# Patient Record
Sex: MALE | Race: WHITE | Employment: OTHER | URBAN - METROPOLITAN AREA
[De-identification: names, ages, dates, MRNs, and addresses within clinical notes are randomized per-mention and may not be internally consistent; named-entity substitution may affect disease eponyms.]

---

## 2017-10-16 ENCOUNTER — APPOINTMENT (OUTPATIENT)
Dept: GENERAL RADIOLOGY | Age: 82
End: 2017-10-16
Attending: EMERGENCY MEDICINE
Payer: MEDICARE

## 2017-10-16 ENCOUNTER — HOSPITAL ENCOUNTER (OUTPATIENT)
Age: 82
Setting detail: OBSERVATION
Discharge: NURSING, REHAB OR LONG-TERM CARE | End: 2017-10-20
Attending: EMERGENCY MEDICINE | Admitting: INTERNAL MEDICINE
Payer: MEDICARE

## 2017-10-16 ENCOUNTER — APPOINTMENT (OUTPATIENT)
Dept: CT IMAGING | Age: 82
End: 2017-10-16
Attending: EMERGENCY MEDICINE
Payer: MEDICARE

## 2017-10-16 ENCOUNTER — APPOINTMENT (OUTPATIENT)
Dept: MRI IMAGING | Age: 82
End: 2017-10-16
Attending: INTERNAL MEDICINE
Payer: MEDICARE

## 2017-10-16 DIAGNOSIS — R29.898 LEFT LEG WEAKNESS: Primary | ICD-10-CM

## 2017-10-16 PROBLEM — E11.9 DM (DIABETES MELLITUS) (HCC): Status: ACTIVE | Noted: 2017-10-16

## 2017-10-16 PROBLEM — R53.1 WEAKNESS: Status: ACTIVE | Noted: 2017-10-16

## 2017-10-16 PROBLEM — N18.30 CKD (CHRONIC KIDNEY DISEASE) STAGE 3, GFR 30-59 ML/MIN (HCC): Status: ACTIVE | Noted: 2017-10-16

## 2017-10-16 PROBLEM — J44.9 COPD (CHRONIC OBSTRUCTIVE PULMONARY DISEASE) (HCC): Status: ACTIVE | Noted: 2017-10-16

## 2017-10-16 PROBLEM — M54.9 BACK PAIN: Status: ACTIVE | Noted: 2017-10-16

## 2017-10-16 LAB
ALBUMIN SERPL-MCNC: 3.3 G/DL (ref 3.5–5)
ALBUMIN/GLOB SERPL: 0.7 {RATIO} (ref 1.1–2.2)
ALP SERPL-CCNC: 80 U/L (ref 45–117)
ALT SERPL-CCNC: 17 U/L (ref 12–78)
ANION GAP SERPL CALC-SCNC: 7 MMOL/L (ref 5–15)
AST SERPL-CCNC: 14 U/L (ref 15–37)
ATRIAL RATE: 93 BPM
BASOPHILS # BLD: 0 K/UL (ref 0–0.1)
BASOPHILS NFR BLD: 1 % (ref 0–1)
BILIRUB SERPL-MCNC: 0.4 MG/DL (ref 0.2–1)
BUN SERPL-MCNC: 39 MG/DL (ref 6–20)
BUN/CREAT SERPL: 18 (ref 12–20)
CALCIUM SERPL-MCNC: 9.5 MG/DL (ref 8.5–10.1)
CALCULATED P AXIS, ECG09: 27 DEGREES
CALCULATED R AXIS, ECG10: -3 DEGREES
CALCULATED T AXIS, ECG11: 6 DEGREES
CHLORIDE SERPL-SCNC: 107 MMOL/L (ref 97–108)
CK MB CFR SERPL CALC: NORMAL % (ref 0–2.5)
CK MB SERPL-MCNC: <1 NG/ML (ref 5–25)
CK SERPL-CCNC: 53 U/L (ref 39–308)
CO2 SERPL-SCNC: 29 MMOL/L (ref 21–32)
CREAT SERPL-MCNC: 2.22 MG/DL (ref 0.7–1.3)
DIAGNOSIS, 93000: NORMAL
EOSINOPHIL # BLD: 0.5 K/UL (ref 0–0.4)
EOSINOPHIL NFR BLD: 7 % (ref 0–7)
ERYTHROCYTE [DISTWIDTH] IN BLOOD BY AUTOMATED COUNT: 13.6 % (ref 11.5–14.5)
GLOBULIN SER CALC-MCNC: 4.5 G/DL (ref 2–4)
GLUCOSE BLD STRIP.AUTO-MCNC: 142 MG/DL (ref 65–100)
GLUCOSE SERPL-MCNC: 160 MG/DL (ref 65–100)
HCT VFR BLD AUTO: 40.4 % (ref 36.6–50.3)
HGB BLD-MCNC: 12.9 G/DL (ref 12.1–17)
INR PPP: 1.2 (ref 0.9–1.1)
LYMPHOCYTES # BLD: 0.9 K/UL (ref 0.8–3.5)
LYMPHOCYTES NFR BLD: 12 % (ref 12–49)
MCH RBC QN AUTO: 29.3 PG (ref 26–34)
MCHC RBC AUTO-ENTMCNC: 31.9 G/DL (ref 30–36.5)
MCV RBC AUTO: 91.6 FL (ref 80–99)
MONOCYTES # BLD: 0.5 K/UL (ref 0–1)
MONOCYTES NFR BLD: 7 % (ref 5–13)
NEUTS SEG # BLD: 5.3 K/UL (ref 1.8–8)
NEUTS SEG NFR BLD: 73 % (ref 32–75)
P-R INTERVAL, ECG05: 238 MS
PLATELET # BLD AUTO: 273 K/UL (ref 150–400)
POTASSIUM SERPL-SCNC: 4.5 MMOL/L (ref 3.5–5.1)
PROT SERPL-MCNC: 7.8 G/DL (ref 6.4–8.2)
PROTHROMBIN TIME: 12 SEC (ref 9–11.1)
Q-T INTERVAL, ECG07: 398 MS
QRS DURATION, ECG06: 142 MS
QTC CALCULATION (BEZET), ECG08: 494 MS
RBC # BLD AUTO: 4.41 M/UL (ref 4.1–5.7)
SERVICE CMNT-IMP: ABNORMAL
SODIUM SERPL-SCNC: 143 MMOL/L (ref 136–145)
VENTRICULAR RATE, ECG03: 93 BPM
WBC # BLD AUTO: 7.3 K/UL (ref 4.1–11.1)

## 2017-10-16 PROCEDURE — G8979 MOBILITY GOAL STATUS: HCPCS

## 2017-10-16 PROCEDURE — 74011000250 HC RX REV CODE- 250: Performed by: INTERNAL MEDICINE

## 2017-10-16 PROCEDURE — 85025 COMPLETE CBC W/AUTO DIFF WBC: CPT | Performed by: EMERGENCY MEDICINE

## 2017-10-16 PROCEDURE — 82550 ASSAY OF CK (CPK): CPT | Performed by: EMERGENCY MEDICINE

## 2017-10-16 PROCEDURE — 99218 HC RM OBSERVATION: CPT

## 2017-10-16 PROCEDURE — 71020 XR CHEST PA LAT: CPT

## 2017-10-16 PROCEDURE — 82962 GLUCOSE BLOOD TEST: CPT

## 2017-10-16 PROCEDURE — 70450 CT HEAD/BRAIN W/O DYE: CPT

## 2017-10-16 PROCEDURE — G8978 MOBILITY CURRENT STATUS: HCPCS

## 2017-10-16 PROCEDURE — 80053 COMPREHEN METABOLIC PANEL: CPT | Performed by: EMERGENCY MEDICINE

## 2017-10-16 PROCEDURE — 77030013140 HC MSK NEB VYRM -A

## 2017-10-16 PROCEDURE — 74011250636 HC RX REV CODE- 250/636: Performed by: INTERNAL MEDICINE

## 2017-10-16 PROCEDURE — 74011250637 HC RX REV CODE- 250/637: Performed by: INTERNAL MEDICINE

## 2017-10-16 PROCEDURE — 36415 COLL VENOUS BLD VENIPUNCTURE: CPT | Performed by: EMERGENCY MEDICINE

## 2017-10-16 PROCEDURE — 96372 THER/PROPH/DIAG INJ SC/IM: CPT

## 2017-10-16 PROCEDURE — 99285 EMERGENCY DEPT VISIT HI MDM: CPT

## 2017-10-16 PROCEDURE — 97161 PT EVAL LOW COMPLEX 20 MIN: CPT

## 2017-10-16 PROCEDURE — 65390000012 HC CONDITION CODE 44 OBSERVATION

## 2017-10-16 PROCEDURE — 85610 PROTHROMBIN TIME: CPT | Performed by: EMERGENCY MEDICINE

## 2017-10-16 PROCEDURE — 93005 ELECTROCARDIOGRAM TRACING: CPT

## 2017-10-16 PROCEDURE — 97530 THERAPEUTIC ACTIVITIES: CPT

## 2017-10-16 PROCEDURE — 94640 AIRWAY INHALATION TREATMENT: CPT

## 2017-10-16 RX ORDER — SODIUM CHLORIDE 0.9 % (FLUSH) 0.9 %
5-10 SYRINGE (ML) INJECTION EVERY 8 HOURS
Status: DISCONTINUED | OUTPATIENT
Start: 2017-10-16 | End: 2017-10-20 | Stop reason: HOSPADM

## 2017-10-16 RX ORDER — ATORVASTATIN CALCIUM 10 MG/1
10 TABLET, FILM COATED ORAL DAILY
Status: DISCONTINUED | OUTPATIENT
Start: 2017-10-17 | End: 2017-10-20 | Stop reason: HOSPADM

## 2017-10-16 RX ORDER — GUAIFENESIN 100 MG/5ML
81 LIQUID (ML) ORAL DAILY
Status: DISCONTINUED | OUTPATIENT
Start: 2017-10-17 | End: 2017-10-20 | Stop reason: HOSPADM

## 2017-10-16 RX ORDER — ARFORMOTEROL TARTRATE 15 UG/2ML
15 SOLUTION RESPIRATORY (INHALATION) 2 TIMES DAILY
Status: DISCONTINUED | OUTPATIENT
Start: 2017-10-16 | End: 2017-10-16

## 2017-10-16 RX ORDER — ATORVASTATIN CALCIUM 10 MG/1
10 TABLET, FILM COATED ORAL DAILY
COMMUNITY

## 2017-10-16 RX ORDER — ARFORMOTEROL TARTRATE 15 UG/2ML
15 SOLUTION RESPIRATORY (INHALATION) 2 TIMES DAILY
COMMUNITY

## 2017-10-16 RX ORDER — VILAZODONE HYDROCHLORIDE 20 MG/1
20 TABLET ORAL EVERY EVENING
Status: DISCONTINUED | OUTPATIENT
Start: 2017-10-16 | End: 2017-10-20 | Stop reason: HOSPADM

## 2017-10-16 RX ORDER — HEPARIN SODIUM 5000 [USP'U]/ML
5000 INJECTION, SOLUTION INTRAVENOUS; SUBCUTANEOUS EVERY 8 HOURS
Status: DISCONTINUED | OUTPATIENT
Start: 2017-10-16 | End: 2017-10-20 | Stop reason: HOSPADM

## 2017-10-16 RX ORDER — BUDESONIDE 0.5 MG/2ML
500 INHALANT ORAL
Status: DISCONTINUED | OUTPATIENT
Start: 2017-10-16 | End: 2017-10-20 | Stop reason: HOSPADM

## 2017-10-16 RX ORDER — SODIUM CHLORIDE 450 MG/100ML
50 INJECTION, SOLUTION INTRAVENOUS CONTINUOUS
Status: DISCONTINUED | OUTPATIENT
Start: 2017-10-16 | End: 2017-10-16

## 2017-10-16 RX ORDER — DEXTROSE 50 % IN WATER (D50W) INTRAVENOUS SYRINGE
12.5-25 AS NEEDED
Status: DISCONTINUED | OUTPATIENT
Start: 2017-10-16 | End: 2017-10-20 | Stop reason: HOSPADM

## 2017-10-16 RX ORDER — FENOFIBRATE 150 MG/1
CAPSULE ORAL
COMMUNITY
End: 2017-10-16

## 2017-10-16 RX ORDER — GLIMEPIRIDE 2 MG/1
2 TABLET ORAL EVERY EVENING
COMMUNITY

## 2017-10-16 RX ORDER — FENOFIBRATE 145 MG/1
145 TABLET, COATED ORAL EVERY EVENING
Status: DISCONTINUED | OUTPATIENT
Start: 2017-10-16 | End: 2017-10-20 | Stop reason: HOSPADM

## 2017-10-16 RX ORDER — BUDESONIDE 0.5 MG/2ML
500 INHALANT ORAL 2 TIMES DAILY
COMMUNITY

## 2017-10-16 RX ORDER — GUAIFENESIN 100 MG/5ML
81 LIQUID (ML) ORAL DAILY
COMMUNITY

## 2017-10-16 RX ORDER — SODIUM CHLORIDE 0.9 % (FLUSH) 0.9 %
5-10 SYRINGE (ML) INJECTION AS NEEDED
Status: DISCONTINUED | OUTPATIENT
Start: 2017-10-16 | End: 2017-10-20 | Stop reason: HOSPADM

## 2017-10-16 RX ORDER — INSULIN LISPRO 100 [IU]/ML
INJECTION, SOLUTION INTRAVENOUS; SUBCUTANEOUS EVERY 6 HOURS
Status: DISCONTINUED | OUTPATIENT
Start: 2017-10-16 | End: 2017-10-16

## 2017-10-16 RX ORDER — ARFORMOTEROL TARTRATE 15 UG/2ML
15 SOLUTION RESPIRATORY (INHALATION)
Status: DISCONTINUED | OUTPATIENT
Start: 2017-10-16 | End: 2017-10-20 | Stop reason: HOSPADM

## 2017-10-16 RX ORDER — MAGNESIUM SULFATE 100 %
4 CRYSTALS MISCELLANEOUS AS NEEDED
Status: DISCONTINUED | OUTPATIENT
Start: 2017-10-16 | End: 2017-10-20 | Stop reason: HOSPADM

## 2017-10-16 RX ORDER — MONTELUKAST SODIUM 10 MG/1
10 TABLET ORAL EVERY EVENING
COMMUNITY

## 2017-10-16 RX ORDER — FENOFIBRATE 145 MG/1
145 TABLET, COATED ORAL EVERY EVENING
COMMUNITY

## 2017-10-16 RX ORDER — MAGNESIUM SULFATE 100 %
4 CRYSTALS MISCELLANEOUS AS NEEDED
Status: DISCONTINUED | OUTPATIENT
Start: 2017-10-16 | End: 2017-10-16 | Stop reason: SDUPTHER

## 2017-10-16 RX ORDER — GLIMEPIRIDE 2 MG/1
2 TABLET ORAL
Status: DISCONTINUED | OUTPATIENT
Start: 2017-10-17 | End: 2017-10-18

## 2017-10-16 RX ORDER — INSULIN LISPRO 100 [IU]/ML
INJECTION, SOLUTION INTRAVENOUS; SUBCUTANEOUS
Status: DISCONTINUED | OUTPATIENT
Start: 2017-10-16 | End: 2017-10-20 | Stop reason: HOSPADM

## 2017-10-16 RX ORDER — MONTELUKAST SODIUM 10 MG/1
10 TABLET ORAL EVERY EVENING
Status: DISCONTINUED | OUTPATIENT
Start: 2017-10-16 | End: 2017-10-20 | Stop reason: HOSPADM

## 2017-10-16 RX ORDER — ZOLPIDEM TARTRATE 5 MG/1
2.5 TABLET ORAL
Status: DISCONTINUED | OUTPATIENT
Start: 2017-10-16 | End: 2017-10-20 | Stop reason: HOSPADM

## 2017-10-16 RX ORDER — VILAZODONE HYDROCHLORIDE 10 MG/1
20 TABLET ORAL EVERY EVENING
COMMUNITY

## 2017-10-16 RX ORDER — ZOLPIDEM TARTRATE 5 MG/1
2.5 TABLET ORAL
COMMUNITY

## 2017-10-16 RX ADMIN — HEPARIN SODIUM 5000 UNITS: 5000 INJECTION, SOLUTION INTRAVENOUS; SUBCUTANEOUS at 19:40

## 2017-10-16 RX ADMIN — FENOFIBRATE 145 MG: 145 TABLET ORAL at 19:40

## 2017-10-16 RX ADMIN — Medication 10 ML: at 19:41

## 2017-10-16 RX ADMIN — VILAZODONE HYDROCHLORIDE 20 MG: 20 TABLET ORAL at 22:17

## 2017-10-16 RX ADMIN — ZOLPIDEM TARTRATE 2.5 MG: 5 TABLET ORAL at 23:51

## 2017-10-16 RX ADMIN — BUDESONIDE 500 MCG: 0.5 INHALANT RESPIRATORY (INHALATION) at 20:47

## 2017-10-16 RX ADMIN — Medication 10 ML: at 22:18

## 2017-10-16 RX ADMIN — MONTELUKAST SODIUM 10 MG: 10 TABLET, FILM COATED ORAL at 22:21

## 2017-10-16 RX ADMIN — ARFORMOTEROL TARTRATE 15 MCG: 15 SOLUTION RESPIRATORY (INHALATION) at 20:47

## 2017-10-16 NOTE — Clinical Note
Status[de-identified] Inpatient [101] Type of Bed: Telemetry [19] Inpatient Hospitalization Certified Necessary for the Following Reasons: 3. Patient receiving treatment that can only be provided in an inpatient setting (further clarification in H&P documentation) Admitting Diagnosis: Weakness [639170] Admitting Physician: Fernie Wisdom Attending Physician: Fernie Wisdom Estimated Length of Stay: 2 Midnights Discharge Plan[de-identified] Extended Care Facility (e.g. Adult Home, Nursing Home, etc.)

## 2017-10-16 NOTE — H&P
[facility]  [address], [address]  [phone]    Admission History and Physical      NAME:  Mora Bunch   :   8/10/1927   MRN:  [id number]     PCP:  David Smith MD     Date/Time:  10/16/2017         Subjective:     CHIEF COMPLAINT: lower back pain and LT leg weakness      HISTORY OF PRESENT ILLNESS:     Mr. Isaías Mireles is a 80 y.o.  male who is admitted with LT leg weakness. Mr. Isaías Mireles with PMH of DM, CKD, HTN, hyperlipidemia, COPD was brought to ER c/o lower back pain and LT leg weakness. Lower back pain started few months ago and recently became worse. Pt is sedentary and has constant sharp lower back pain. The pain is moderate in intensity worse with movement. Yesterday, he started to have LT leg weakness, which was sudden in onset. Has difficulty to ambulate and family had to help him to go to bed. Denies trauma to his back. Denies weakness or numbness of the arm. Denies vision problem or slurred speech. No urine or bowel incontinent. Past Medical History:   Diagnosis Date    [facility] obstructive pulmonary disease ([facility] [address] [facility]     Diabetes ([facility] [address]         Past Surgical History:   Procedure Laterality Date    HX CHOLECYSTECTOMY      HX HEENT      bilateral cataracts    HX ORTHOPAEDIC         Social History   Substance Use Topics    Smoking status: Former Smoker     Packs/day: 1.00     Years: 10.00     Quit date:     Smokeless tobacco: Never Used    Alcohol use No        Family History   Problem Relation Age of Onset    Diabetes Mother         No Known Allergies     Prior to Admission medications    Medication Sig Start Date End Date Taking? Authorizing Provider   aspirin 81 mg chewable tablet Take 81 mg by mouth daily. Yes Marie Ibarra MD   SITagliptin (JANUVIA) 100 mg tablet Take 100 mg by mouth daily. Yes Marie Ibarra MD   atorvastatin (LIPITOR) 10 mg tablet Take 10 mg by mouth daily.    Yes Marie Ibarra MD   montelukast (SINGULAIR) 10 mg tablet Take 10 mg by mouth every evening. Yes Marie Ibarra MD   glimepiride (AMARYL) 2 mg tablet Take 2 mg by mouth every evening. Yes Marie Ibarra MD   vilazodone (VIIBRYD) 10 mg tab tablet Take 20 mg by mouth every evening. Yes Marie Ibarra MD   zolpidem (AMBIEN) 5 mg tablet Take 2.5 mg by mouth nightly. Yes Marie Ibarra MD   budesonide (PULMICORT) 0.5 mg/2 mL nbsp 500 mcg by Nebulization route two (2) times a day. Use with arformoterol (Brovana) nebs   Yes Marie Ibarra MD   arformoterol (BROVANA) 15 mcg/2 mL nebu neb solution 15 mcg by Nebulization route two (2) times a day. Use with budesonide (Pulmicort) nebs   Yes Marie Ibarra MD   fenofibrate nanocrystallized (TRICOR) 145 mg tablet Take 145 mg by mouth every evening. Yes Historical Provider   Vit A,C,E-Zinc-Copper (PRESERVISION AREDS) cap capsule Take 1 Cap by mouth two (2) times a day.    Yes Historical Provider         Review of Systems:  (bold if positive, if negative)    Gen:  Eyes:  ENT:  CVS:  Pulm:  GI:    :    MS:  Skin:  Psych:  Endo:    Hem:  Renal:    Neuro:  weakness,          Objective:      VITALS:    Vital signs reviewed; most recent are:    Visit Vitals    /68    Pulse 82    Temp (P) 98.2 °F (36.8 °C)    Resp 15    SpO2 99%     SpO2 Readings from Last 6 Encounters:   10/16/17 99%    O2 Flow Rate (L/min): 2 l/min   No intake or output data in the 24 hours ending 10/16/17 0046         Exam:     Physical Exam:    Gen:  Well-developed, well-nourished, in no acute distress  HEENT:  Pink conjunctivae, PERRL, hearing intact to voice, moist mucous membranes  Neck:  Supple, without masses, thyroid non-tender  Resp:  No accessory muscle use, clear breath sounds without wheezes rales or rhonchi  Card:  No murmurs, normal S1, S2 without thrills, bruits or peripheral edema  Abd:  Soft, non-tender, non-distended, normoactive bowel sounds are present, no palpable organomegaly and no detectable hernias  Lymph:  No cervical or inguinal adenopathy  Musc:  No cyanosis or clubbing  Skin:  No rashes or ulcers, skin turgor is good  Neuro:  Cranial nerves are grossly intact, BL leg weakness. , follows commands appropriately  Psych:  Good insight, oriented to person, place and time, alert       Labs:    Recent Labs      10/16/17   1206   WBC  7.3   HGB  12.9   HCT  40.4   PLT  273     Recent Labs      10/16/17   1206   NA  143   K  4.5   CL  107   CO2  29   GLU  160*   BUN  39*   CREA  2.22*   CA  9.5   ALB  3.3*   TBILI  0.4   SGOT  14*   ALT  17     No results found for: GLUCPOC  No results for input(s): PH, PCO2, PO2, HCO3, FIO2 in the last 72 hours. Recent Labs      10/16/17   1206   INR  1.2*       Telemetry reviewed:           Assessment/Plan:    1. LT leg weakness (10/16/2017)/ lower back pain. Will admit under observation. Check MRI of the L spine to R/O L spine issues causing his current problem. CT of the head is unremarkable. Pain control. Consult orthopedics, PT/OT. Fall precaution. 2.  DM (diabetes mellitus) ([facility] [address] (10/16/2017). Continue home meds and cover with SSI    3.  CKD (chronic kidney disease) stage 3, GFR 30-59 ml/min (10/16/2017). unknown baseline creatinine. Continue to monitor. 4.  COPD (chronic obstructive pulmonary disease) ([facility] [address] (10/16/2017). Stable. Not wheezing. Continue home nebs. 5.  Hyperlipidemia. Continue statin. Code status: DNR.      Previous medical records reviewed     Risk of deterioration: high      Total time spent with patient: 79 [address] discussed with: Patient, Family, Nursing Staff and >50% of time spent in counseling and coordination of care    Discussed:  Care Plan    Prophylaxis:  Hep SQ    Probable Disposition:  Home w/Family           ___________________________________________________    Attending Physician: Leti Augustin MD

## 2017-10-16 NOTE — PROGRESS NOTES
BSI: MED RECONCILIATION    Comments/Recommendations:    Patient reports compliance with medications below, although he acknowledges that he did not have his nebulizer treatments as usual today. Medications added:     · Preservision PO BID    Medications removed:    · none    Medications adjusted:    · Changed fenofibrate, glimipiride, montelukast, and Viibryd all to QPM per patient's medication schedule    Information obtained from: patient, patient's daughter, medication list/schedule from home    Significant PMH/Disease States:   Past Medical History:   Diagnosis Date    Chronic obstructive pulmonary disease ([facility] [address]     Diabetes ([facility] [address]      Chief Complaint for this Admission:   Chief Complaint   Patient presents with    Back Pain     Allergies: Review of patient's allergies indicates no known allergies. Prior to Admission Medications:     Medication Documentation Review Audit       Reviewed by ARUN ChavezD (Pharmacist) on 10/16/17 at 53-69-10-18         Medication Sig Documenting Provider Last Dose Status Taking?      arformoterol (BROVANA) 15 mcg/2 mL nebu neb solution 15 mcg by Nebulization route two (2) times a day. Use with budesonide (Pulmicort) nebs Marie Ibarra MD 10/15/2017 pm Active Yes    aspirin 81 mg chewable tablet Take 81 mg by mouth daily. Marie Ibarra MD 10/16/2017 am Active Yes    atorvastatin (LIPITOR) 10 mg tablet Take 10 mg by mouth daily. Marie Ibarra MD 10/16/2017 am Active Yes    budesonide (PULMICORT) 0.5 mg/2 mL nbsp 500 mcg by Nebulization route two (2) times a day. Use with arformoterol (Brovana) karstens Marie Ibarra MD 10/15/2017 pm Active Yes    fenofibrate nanocrystallized (TRICOR) 145 mg tablet Take 145 mg by mouth every evening. Historical Provider 10/15/2017 pm Active Yes    glimepiride (AMARYL) 2 mg tablet Take 2 mg by mouth every evening. Marie Ibarra MD 10/15/2017 pm Active Yes    montelukast (SINGULAIR) 10 mg tablet Take 10 mg by mouth every evening.  Marie Ibarra MD 10/15/2017 pm Active Yes    SITagliptin (JANUVIA) 100 mg tablet Take 100 mg by mouth daily. Marie Ibarra MD 10/16/2017 am Active Yes    vilazodone (VIIBRYD) 10 mg tab tablet Take 20 mg by mouth every evening. Marie Ibarra MD 10/15/2017 pm Active Yes    Vit A,C,E-Zinc-Copper (PRESERVISION AREDS) cap capsule Take 1 Cap by mouth two (2) times a day. Historical Provider 10/16/2017 am Active Yes    zolpidem (AMBIEN) 5 mg tablet Take 2.5 mg by mouth nightly. Marie Ibarra MD 10/15/2017 hs Active Yes                  Thank you for the consult,  Jamal CHAVEZ, [facility]

## 2017-10-16 NOTE — PROGRESS NOTES
[facility] Dosing Services  Pharmacist Renal Dosing Progress Note for sitagliptin   Physician: Kathie Heaton    Sitagliptin was automatically dose-adjusted per [facility] P&T Committee Protocol with respect to renal function. Order changed to sitagliptin 25 mg PO daily with breakfast for CrCl < 30 mL/min. Pt Weight:   Wt Readings from Last 1 Encounters:   10/16/17 99.8 kg (220 lb)     Previous Regimen Sitagliptin 100 mg PO daily   Serum Creatinine Lab Results   Component Value Date/Time    Creatinine 2.22 10/16/2017 12:06 PM       Creatinine Clearance Estimated Creatinine Clearance: 25.3 mL/min (based on Cr of 2.22). BUN Lab Results   Component Value Date/Time    BUN 39 10/16/2017 12:06 PM         Additional notes:   Unknown baseline renal function   Monitor BG    Pharmacy will continue to monitor patient's renal fucntion daily and will make dosage adjustments based upon changing renal function.     Thank you,  Adriano Stephenson, PharmD, [facility]

## 2017-10-16 NOTE — IP AVS SNAPSHOT
[address] [facility] 
[phone] Patient: Mora Bunch MRN: [id number] :8/10/1927 You are allergic to the following No active allergies Immunizations Administered for This Admission Name Date Influenza Vaccine (Quad) PF 10/20/2017 Recent Documentation Smoking Status Former Smoker Emergency Contacts Name Discharge Info Relation Home Work [employer] DISCHARGE CAREGIVER [3] Daughter [21]   [phone] About your hospitalization You were admitted on:  2017 You last received care in the:  [facility] 5M1 MED SURG 1 You were discharged on:  2017 Unit phone number:  [phone] Why you were hospitalized Your primary diagnosis was:  Not on File Your diagnoses also included:  Weakness, Ckd (Chronic Kidney Disease) Stage 3, Gfr 30-59 Ml/Min, Dm (Diabetes Mellitus) (Hcc), Copd (Chronic Obstructive Pulmonary Disease) (Hcc), Back Pain Providers Seen During Your Hospitalizations Provider Role Specialty Primary office phone Lucy Arreola MD Attending Provider Emergency Medicine [phone] Vikki Butt MD Attending Provider Internal Medicine [phone] Your Primary Care Physician (PCP) Primary Care Physician Office Phone Office Fax [facility] [phone] Follow-up Information Follow up With Details Comments Contact Info David Smith MD Schedule an appointment as soon as possible for a visit in 1 week  [address] [facility] [facility] 
[phone] Rashel Jimenez MD Schedule an appointment as soon as possible for a visit As needed [address] 
[phone] [address] [place] [address] [phone] Current Discharge Medication List  
  
START taking these medications Dose & Instructions Dispensing Information Comments Morning Noon Evening Bedtime  
 methylPREDNISolone 4 mg Tab Commonly known as:  MEDROL Your last dose was: Your next dose is: Take 1 tab 3 times a day for 1 day, then 2 times a day for 1 days, then 1 per day. Quantity:  6 Tab Refills:  0  
     
   
   
   
  
 senna-docusate 8.6-50 mg per tablet Commonly known as:  [employer] Your last dose was: Your next dose is:    
   
   
 Dose:  1 Tab Take 1 Tab by mouth daily for 30 days. Quantity:  30 Tab Refills:  0 CONTINUE these medications which have CHANGED Dose & Instructions Dispensing Information Comments Morning Noon Evening Bedtime SITagliptin 50 mg tablet Commonly known as:  Ina Sine What changed:   
- medication strength 
- how much to take Your last dose was: Your next dose is:    
   
   
 Dose:  50 mg Take 1 Tab by mouth daily. Quantity:  30 Tab Refills:  0 CONTINUE these medications which have NOT CHANGED Dose & Instructions Dispensing Information Comments Morning Noon Evening Bedtime  
 aspirin 81 mg chewable tablet Your last dose was: Your next dose is:    
   
   
 Dose:  81 mg Take 81 mg by mouth daily. Refills:  0  
     
   
   
   
  
 atorvastatin 10 mg tablet Commonly known as:  LIPITOR Your last dose was: Your next dose is:    
   
   
 Dose:  10 mg Take 10 mg by mouth daily. Refills:  0 BROVANA 15 mcg/2 mL Nebu neb solution Generic drug:  arformoterol Your last dose was: Your next dose is:    
   
   
 Dose:  15 mcg 15 mcg by Nebulization route two (2) times a day. Use with budesonide (Pulmicort) nebs Refills:  0 budesonide 0.5 mg/2 mL Nbsp Commonly known as:  PULMICORT Your last dose was: Your next dose is:    
   
   
 Dose:  500 mcg 500 mcg by Nebulization route two (2) times a day. Use with arformoterol (Brovana) nebs Refills:  0  
     
   
   
   
  
 fenofibrate nanocrystallized 145 mg tablet Commonly known as:  [employer] Your last dose was: Your next dose is:    
   
   
 Dose:  145 mg Take 145 mg by mouth every evening. Refills:  0  
     
   
   
   
  
 glimepiride 2 mg tablet Commonly known as:  AMARYL Your last dose was: Your next dose is:    
   
   
 Dose:  2 mg Take 2 mg by mouth every evening. Refills:  0  
     
   
   
   
  
 montelukast 10 mg tablet Commonly known as:  SINGULAIR Your last dose was: Your next dose is:    
   
   
 Dose:  10 mg Take 10 mg by mouth every evening. Refills:  0 PRESERVISION AREDS Cap capsule Generic drug:  Vit A,C,E-Zinc-Copper Your last dose was: Your next dose is:    
   
   
 Dose:  1 Cap Take 1 Cap by mouth two (2) times a day. Refills:  0 VIIBRYD 10 mg Tab tablet Generic drug:  vilazodone Your last dose was: Your next dose is:    
   
   
 Dose:  20 mg Take 20 mg by mouth every evening. Refills:  0  
     
   
   
   
  
 zolpidem 5 mg tablet Commonly known as:  AMBIEN Your last dose was: Your next dose is:    
   
   
 Dose:  2.5 mg Take 2.5 mg by mouth nightly. Refills:  0 Where to Get Your Medications Information on where to get these meds will be given to you by the nurse or doctor. ! Ask your nurse or doctor about these medications  
  methylPREDNISolone 4 mg Tab  
 senna-docusate 8.6-50 mg per tablet SITagliptin 50 mg tablet Discharge Instructions Patient Discharge Instructions Jesse Zepeda / [id number] : 8/10/1927 Admitted 10/16/2017 Discharged: 10/19/2017 Primary Diagnoses Problem List as of 10/19/2017  Date Reviewed: 10/16/2017 Codes Class Noted - Resolved Weakness CKD (chronic kidney disease) stage 3, GFR 30-59 ml/min DM (diabetes mellitus) ([facility] [address] COPD (chronic obstructive pulmonary disease) ([facility] [address] Back pain Take Home Medications · It is important that you take the medication exactly as they are prescribed. · Keep your medication in the bottles provided by the pharmacist and keep a list of the medication names, dosages, and times to be taken in your wallet. · Do not take other medications without consulting your doctor. What to do at [facility] Recommended diet: Cardiac Diet, Diabetic Diet and Low fat, Low cholesterol Recommended activity: Activity as tolerated If you experience worse pain, please follow up with Orthopedic surgery. Follow-up with your PCP in a few weeks Information obtained by : 
I understand that if any problems occur once I am at home I am to contact my physician. I understand and acknowledge receipt of the instructions indicated above. Physician's or R.N.'s Signature                                                                  Date/Time Patient or Representative Signature                                                          Date/Time Discharge Orders None [employer] Announcement We are excited to announce that we are making your provider's discharge notes available to you in [employer].   You will see these notes when they are completed and signed by the physician that discharged you from your recent hospital stay. If you have any questions or concerns about any information you see in [employer], please call the Health Information Department where you were seen or reach out to your Primary Care Provider for more information about your plan of care. Introducing [facility]! Wilton Martinez introduces [employer] patient portal. Now you can access parts of your medical record, email your doctor's office, and request medication refills online. 1. In your internet browser, go to [email or web address] 2. Click on the First Time User? Click Here link in the Sign In box. You will see the New Member Sign Up page. 3. Enter your [employer] Access Code exactly as it appears below. You will not need to use this code after youve completed the sign-up process. If you do not sign up before the expiration date, you must request a new code. · [employer] Access Code: [email or web address] Expires: 1/15/2018  1:28 PM 
 
4. Enter the last four digits of your Social Security Number (xxxx) and Date of Birth (mm/dd/yyyy) as indicated and click Submit. You will be taken to the next sign-up page. 5. Create a [employer] ID. This will be your [employer] login ID and cannot be changed, so think of one that is secure and easy to remember. 6. Create a [employer] password. You can change your password at any time. 7. Enter your Password Reset Question and Answer. This can be used at a later time if you forget your password. 8. Enter your e-mail address. You will receive e-mail notification when new information is available in [address] 9. Click Sign Up. You can now view and download portions of your medical record. 10. Click the Download Summary menu link to download a portable copy of your medical information. If you have questions, please visit the Frequently Asked Questions section of the [employer] website. Remember, [employer] is NOT to be used for urgent needs. For medical emergencies, dial 911. Now available from your iPhone and Android! General Information Please provide this summary of care documentation to your next provider. Patient Signature:  ____________________________________________________________ Date:  ____________________________________________________________  
  
Jean-Claude Bougie Provider Signature:  ____________________________________________________________ Date:  ____________________________________________________________

## 2017-10-16 NOTE — PROGRESS NOTES
Problem: Mobility Impaired (Adult and Pediatric)  Goal: *Acute Goals and Plan of Care (Insert Text)  Physical Therapy Goals  Initiated 10/16/2017  1. Patient will move from supine to sit and sit to supine in bed with independence within 7 day(s). 2. Patient will transfer from bed to chair and chair to bed with modified independence using the least restrictive device within 7 day(s). 3. Patient will perform sit to stand with modified independence within 7 day(s). 4. Patient will ambulate with modified independence for 150 feet with the least restrictive device within 7 day(s). 5. Patient will ascend/descend 2 stairs with one handrail(s) with minimal assistance/contact guard assist within 7 day(s). PHYSICAL THERAPY EVALUATION  Patient: Julio C Mix (79 y.o. male)  Date: 10/16/2017  Primary Diagnosis: Weakness  Weakness        Precautions:   Fall      ASSESSMENT :  Based on the objective data described below, the patient presents with c/o back pain, decreased strength, and impaired balance limiting patient's safe functional mobility. Patient ambulates with SPC at baseline, lives with 2 daughters. Patient required mod assist x 1 for bed mobility this date, but unable to stand due to c/o worsened back pain with mobility. Patient assisted back to supine and positioned for comfort. Patient awaiting MRI and says he is to be admitted. Will continue to follow and progress mobility as tolerated. Discharge disposition pending. Patient will benefit from skilled intervention to address the above impairments.   Patients rehabilitation potential is considered to be Fair  Factors which may influence rehabilitation potential include:   [ ]         None noted  [ ]         Mental ability/status  [X]         Medical condition  [X]         Home/family situation and support systems  [ ]         Safety awareness  [X]         Pain tolerance/management  [ ]         Other:        PLAN :  Recommendations and Planned Interventions:  [X]           Bed Mobility Training             [ ]    Neuromuscular Re-Education  [X]           Transfer Training                   [ ]    Orthotic/Prosthetic Training  [X]           Gait Training                         [ ]    Modalities  [X]           Therapeutic Exercises           [ ]    Edema Management/Control  [X]           Therapeutic Activities            [X]    Patient and Family Training/Education  [ ]           Other (comment):     Frequency/Duration: Patient will be followed by physical therapy  5 times a week to address goals. Discharge Recommendations: To Be Determined  Further Equipment Recommendations for Discharge: TBD       SUBJECTIVE:   Patient stated I don't think I can do it. My leg just gave out on me yesterday.       OBJECTIVE DATA SUMMARY:   HISTORY:    Past Medical History:   Diagnosis Date    [facility]      Diabetes ([facility] [address]       Past Surgical History:   Procedure Laterality Date    HX CHOLECYSTECTOMY        HX HEENT         bilateral cataracts    HX ORTHOPAEDIC         Prior Level of Function/Home Situation: ambulates with SPC, lives with 2 daughters  Personal factors and/or comorbidities impacting plan of care:      Home Situation  Home Environment: Private residence  # Steps to Enter: 2  Rails to Enter: Yes  One/Two Story Residence: One story  Living Alone: No  Support Systems: Child(kori)  Patient Expects to be Discharged to[de-identified] Private residence  Current DME Used/Available at Home: Cane, straight, Other (comment) (transport chair)     EXAMINATION/PRESENTATION/DECISION MAKING:   Critical Behavior:  Neurologic State: Alert  Orientation Level: Oriented X4  Cognition: Appropriate decision making, Appropriate for age attention/concentration, Appropriate safety awareness  Safety/Judgement: Awareness of environment, Insight into deficits  Hearing:   Auditory  Auditory Impairment: Hard of hearing, bilateral  Skin:  Intact   Edema: none Range Of Motion:  AROM: Within functional limits           PROM: Within functional limits           Strength:    Strength: Generally decreased, functional                    Tone & Sensation:   Tone: Normal              Sensation: Intact               Coordination:  Coordination: Within functional limits  Vision:      Functional Mobility:  Bed Mobility:     Supine to Sit: Moderate assistance;Assist x1  Sit to Supine: Moderate assistance;Assist x1     Transfers:  Sit to Stand:  (unable due to pain)                          Balance:   Sitting: Intact; With support  Standing:  (unable due to pain)  Ambulation/Gait Training:              Gait Description (WDL):  (unable)                 Functional Measure:  Barthel Index:      Bathin  Bladder: 10  Bowels: 10  Groomin  Dressin  Feeding: 10  Mobility: 0  Stairs: 0  Toilet Use: 0  Transfer (Bed to Chair and Back): 0  Total: 40         Barthel and G-code impairment scale:  Percentage of impairment CH  0% CI  1-19% CJ  20-39% CK  40-59% CL  60-79% CM  80-99% CN  100%   Barthel Score 0-100 100 99-80 79-60 59-40 20-39 1-19    0   Barthel Score 0-20 20 17-19 13-16 9-12 5-8 1-4 0      The Barthel ADL Index: Guidelines  1. The index should be used as a record of what a patient does, not as a record of what a patient could do. 2. The main aim is to establish degree of independence from any help, physical or verbal, however minor and for whatever reason. 3. The need for supervision renders the patient not independent. 4. A patient's performance should be established using the best available evidence. Asking the patient, friends/relatives and nurses are the usual sources, but direct observation and common sense are also important. However direct testing is not needed. 5. Usually the patient's performance over the preceding 24-48 hours is important, but occasionally longer periods will be relevant.   6. Middle categories imply that the patient supplies over 50 per cent of the effort. 7. Use of aids to be independent is allowed. Mitchell Johnson., Barthel, D.W. (5800). Functional evaluation: the Barthel Index. [address] (14)2. WILIAN Ramirez, Lynn Eagle.Leo., [place], [address] (1999). Measuring the change indisability after inpatient rehabilitation; comparison of the responsiveness of the Barthel Index and Functional [place] Measure. Journal of Neurology, Neurosurgery, and Psychiatry, 66(4), 806-502. EZEKIEL Landon, MADELAINE Ramsey, & Rossi Hall M.A. (2004.) Assessment of post-stroke quality of life in cost-effectiveness studies: The usefulness of the Barthel Index and the EuroQoL-5D. Quality of Life Research, 13, 888-14         G codes: In compliance with CMSs Claims Based Outcome Reporting, the following G-code set was chosen for this patient based on their primary functional limitation being treated: The outcome measure chosen to determine the severity of the functional limitation was the Barthel Index with a score of 40/100 which was correlated with the impairment scale. · Mobility - Walking and Moving Around:               - CURRENT STATUS:    CK - 40%-59% impaired, limited or restricted               - GOAL STATUS:           CJ - 20%-39% impaired, limited or restricted               - D/C STATUS:                       ---------------To be determined---------------         Pain:  Pain Scale 1: (P) Numeric (0 - 10)  Pain Intensity 1: (P) 2              Activity Tolerance:   VSS  Please refer to the flowsheet for vital signs taken during this treatment.   After treatment:   [ ]         Patient left in no apparent distress sitting up in chair  [X]         Patient left in no apparent distress in bed  [X]         Call bell left within reach  [X]         Nursing notified  [ ]         Caregiver present  [ ]         Bed alarm activated      COMMUNICATION/EDUCATION:   The patients plan of care was discussed with: Registered Nurse.  [X]         Fall prevention education was provided and the patient/caregiver indicated understanding. [X]         Patient/family have participated as able in goal setting and plan of care. [X]         Patient/family agree to work toward stated goals and plan of care. [ ]         Patient understands intent and goals of therapy, but is neutral about his/her participation. [ ]         Patient is unable to participate in goal setting and plan of care.      Thank you for this referral.  Higinio Nuñez, PT   Time Calculation: 15 mins

## 2017-10-16 NOTE — ED NOTES
Pt's daughter states pt normally wears home O2 at 2lpm. Dr. Birch Flurry notified. Pt placed on O2 at 2lpm via nasal cannula.

## 2017-10-16 NOTE — ED PROVIDER NOTES
Patient is a 80 y.o. male presenting with back pain. The history is provided by the patient. Back Pain    This is a chronic problem. The problem has been resolved. Patient reports not work related injury. The pain is associated with no known injury. The pain is present in the lower back. Associated symptoms include numbness and weakness. Pertinent negatives include no fever, no headaches and no abdominal pain. The patient's surgical history non-contributory        Past Medical History:   Diagnosis Date    [facility] obstructive pulmonary [facility] ([facility] [address] [facility]     Diabetes ([facility] [address] [facility]        Past Surgical History:   Procedure Laterality Date    HX CHOLECYSTECTOMY      HX HEENT      bilateral cataracts    HX ORTHOPAEDIC           Family History:   Problem Relation Age of Onset    Diabetes Mother        Social History     Social History    Marital status:      Spouse name: N/A    Number of children: N/A    Years of education: N/A     Occupational History    Not on file. Social History Main Topics    Smoking status: Former Smoker     Packs/day: 1.00     Years: 10.00     Quit date: 1967    Smokeless tobacco: Never Used    Alcohol use No    Drug use: No    Sexual activity: Not on file      Comment: not asked     Other Topics Concern    Not on file     Social History Narrative    No narrative on file         ALLERGIES: Review of patient's allergies indicates no known allergies. Review of Systems   Constitutional: Negative for chills and fever. Gastrointestinal: Negative for abdominal pain, nausea and vomiting. Genitourinary: Negative for flank pain. Musculoskeletal: Positive for back pain. Negative for neck pain. Neurological: Positive for weakness and numbness. Negative for seizures, light-headedness and headaches. All other systems reviewed and are negative.       Vitals:    10/16/17 1315 10/16/17 1330 10/16/17 1345 10/16/17 1412   BP: 120/66 119/61 130/68 145/76   Pulse: 90 88 88    Resp: 18 17 17    Temp:       SpO2: 98% 99% 99% 95%            Physical Exam   Constitutional: He is oriented to person, place, and time. He appears well-developed and well-nourished. No distress. HENT:   Head: Normocephalic and atraumatic. Mouth/Throat: Oropharynx is clear and moist.   Eyes: Conjunctivae and EOM are normal. Pupils are equal, round, and reactive to light. Neck: Normal range of motion. Cardiovascular: Normal rate, regular rhythm, normal heart sounds and intact distal pulses. No murmur heard. Pulmonary/Chest: Effort normal and breath sounds normal. No stridor. No respiratory distress. Wearing NC oxygen (baseline 2 liters)   Abdominal: Soft. Bowel sounds are normal. There is no tenderness. Musculoskeletal: Normal range of motion. He exhibits no edema, tenderness or deformity. No tenderness of lower back   Neurological: He is alert and oriented to person, place, and time. No cranial nerve deficit. GCS eye subscore is 4. GCS verbal subscore is 5. GCS motor subscore is 6. Decreased strength in left lower extremity vs the right and slight decrease in sensation vs the right leg as well   Skin: Skin is warm and dry. He is not diaphoretic. Psychiatric: He has a normal mood and affect. Nursing note and vitals reviewed. MDM  Number of Diagnoses or Management Options  Left leg weakness:   Diagnosis management comments: Patient with left leg weakness since yesterday - hx of lower back pain - but denies any back pain at this time, denies any fall, but near fall yesterday when his left leg felt like it would not hold him up. Patient able to move the left leg some at this time. Check labs, get Head CT and EKG and eval for possible CVA - if neg head CT will admit for further CVA eval and admitting team may consider lumber spine MRI to try to explain left leg symptoms.        Amount and/or Complexity of Data Reviewed  Clinical lab tests: ordered and reviewed  Tests in the radiology section of CPT®: ordered and reviewed  Obtain history from someone other than the patient: yes  Discuss the patient with other providers: yes  Independent visualization of images, tracings, or specimens: yes    Risk of Complications, Morbidity, and/or Mortality  Presenting problems: high  Diagnostic procedures: high  Management options: high    Patient Progress  Patient progress: stable    ED Course       Procedures

## 2017-10-16 NOTE — ROUTINE PROCESS
TRANSFER - OUT REPORT:    Verbal report given to Yadira Flores on Romi Reil  being transferred to [facility] for routine progression of care       Report consisted of patients Situation, Background, Assessment and   Recommendations(SBAR). Information from the following report(s) SBAR, ED Summary, [facility] and Recent Results was reviewed with the receiving nurse. Opportunity for questions and clarification was provided.

## 2017-10-17 ENCOUNTER — APPOINTMENT (OUTPATIENT)
Dept: ULTRASOUND IMAGING | Age: 82
End: 2017-10-17
Attending: INTERNAL MEDICINE
Payer: MEDICARE

## 2017-10-17 LAB
APPEARANCE UR: CLEAR
BACTERIA URNS QL MICRO: NEGATIVE /HPF
BILIRUB UR QL: NEGATIVE
COLOR UR: ABNORMAL
CREAT UR-MCNC: 125.97 MG/DL
EPITH CASTS URNS QL MICRO: ABNORMAL /LPF
GLUCOSE BLD STRIP.AUTO-MCNC: 114 MG/DL (ref 65–100)
GLUCOSE BLD STRIP.AUTO-MCNC: 137 MG/DL (ref 65–100)
GLUCOSE BLD STRIP.AUTO-MCNC: 156 MG/DL (ref 65–100)
GLUCOSE BLD STRIP.AUTO-MCNC: 61 MG/DL (ref 65–100)
GLUCOSE BLD STRIP.AUTO-MCNC: 98 MG/DL (ref 65–100)
GLUCOSE UR STRIP.AUTO-MCNC: 100 MG/DL
HGB UR QL STRIP: NEGATIVE
HYALINE CASTS URNS QL MICRO: ABNORMAL /LPF (ref 0–5)
KETONES UR QL STRIP.AUTO: NEGATIVE MG/DL
LEUKOCYTE ESTERASE UR QL STRIP.AUTO: ABNORMAL
NITRITE UR QL STRIP.AUTO: NEGATIVE
OSMOLALITY UR: 520 MOSM/KG H2O
PH UR STRIP: 5.5 [PH] (ref 5–8)
PROT UR STRIP-MCNC: NEGATIVE MG/DL
PROT UR-MCNC: 32 MG/DL (ref 0–11.9)
RBC #/AREA URNS HPF: ABNORMAL /HPF (ref 0–5)
SERVICE CMNT-IMP: ABNORMAL
SERVICE CMNT-IMP: NORMAL
SODIUM UR-SCNC: 58 MMOL/L
SP GR UR REFRACTOMETRY: 1.02 (ref 1–1.03)
UROBILINOGEN UR QL STRIP.AUTO: 1 EU/DL (ref 0.2–1)
WBC URNS QL MICRO: ABNORMAL /HPF (ref 0–4)

## 2017-10-17 PROCEDURE — 77010033678 HC OXYGEN DAILY

## 2017-10-17 PROCEDURE — 84300 ASSAY OF URINE SODIUM: CPT | Performed by: INTERNAL MEDICINE

## 2017-10-17 PROCEDURE — 94640 AIRWAY INHALATION TREATMENT: CPT

## 2017-10-17 PROCEDURE — 82570 ASSAY OF URINE CREATININE: CPT | Performed by: INTERNAL MEDICINE

## 2017-10-17 PROCEDURE — 97116 GAIT TRAINING THERAPY: CPT

## 2017-10-17 PROCEDURE — 99218 HC RM OBSERVATION: CPT

## 2017-10-17 PROCEDURE — G8987 SELF CARE CURRENT STATUS: HCPCS

## 2017-10-17 PROCEDURE — 83935 ASSAY OF URINE OSMOLALITY: CPT | Performed by: INTERNAL MEDICINE

## 2017-10-17 PROCEDURE — 74011250636 HC RX REV CODE- 250/636: Performed by: INTERNAL MEDICINE

## 2017-10-17 PROCEDURE — 74011000250 HC RX REV CODE- 250: Performed by: INTERNAL MEDICINE

## 2017-10-17 PROCEDURE — 76770 US EXAM ABDO BACK WALL COMP: CPT

## 2017-10-17 PROCEDURE — 81001 URINALYSIS AUTO W/SCOPE: CPT | Performed by: INTERNAL MEDICINE

## 2017-10-17 PROCEDURE — 97535 SELF CARE MNGMENT TRAINING: CPT

## 2017-10-17 PROCEDURE — 96372 THER/PROPH/DIAG INJ SC/IM: CPT

## 2017-10-17 PROCEDURE — 82962 GLUCOSE BLOOD TEST: CPT

## 2017-10-17 PROCEDURE — 74011250637 HC RX REV CODE- 250/637: Performed by: INTERNAL MEDICINE

## 2017-10-17 PROCEDURE — 96361 HYDRATE IV INFUSION ADD-ON: CPT

## 2017-10-17 PROCEDURE — G8988 SELF CARE GOAL STATUS: HCPCS

## 2017-10-17 PROCEDURE — 74011636637 HC RX REV CODE- 636/637: Performed by: INTERNAL MEDICINE

## 2017-10-17 PROCEDURE — 84156 ASSAY OF PROTEIN URINE: CPT | Performed by: INTERNAL MEDICINE

## 2017-10-17 PROCEDURE — 97165 OT EVAL LOW COMPLEX 30 MIN: CPT

## 2017-10-17 RX ADMIN — ASPIRIN 81 MG 81 MG: 81 TABLET ORAL at 08:30

## 2017-10-17 RX ADMIN — VILAZODONE HYDROCHLORIDE 20 MG: 20 TABLET ORAL at 18:15

## 2017-10-17 RX ADMIN — ARFORMOTEROL TARTRATE 15 MCG: 15 SOLUTION RESPIRATORY (INHALATION) at 07:08

## 2017-10-17 RX ADMIN — Medication 10 ML: at 13:00

## 2017-10-17 RX ADMIN — SODIUM CHLORIDE 1000 ML: 900 INJECTION, SOLUTION INTRAVENOUS at 12:54

## 2017-10-17 RX ADMIN — HEPARIN SODIUM 5000 UNITS: 5000 INJECTION, SOLUTION INTRAVENOUS; SUBCUTANEOUS at 06:22

## 2017-10-17 RX ADMIN — ATORVASTATIN CALCIUM 10 MG: 10 TABLET, FILM COATED ORAL at 08:30

## 2017-10-17 RX ADMIN — MONTELUKAST SODIUM 10 MG: 10 TABLET, FILM COATED ORAL at 17:58

## 2017-10-17 RX ADMIN — ARFORMOTEROL TARTRATE 15 MCG: 15 SOLUTION RESPIRATORY (INHALATION) at 19:32

## 2017-10-17 RX ADMIN — HEPARIN SODIUM 5000 UNITS: 5000 INJECTION, SOLUTION INTRAVENOUS; SUBCUTANEOUS at 22:25

## 2017-10-17 RX ADMIN — GLIMEPIRIDE 2 MG: 2 TABLET ORAL at 08:30

## 2017-10-17 RX ADMIN — INSULIN LISPRO 2 UNITS: 100 INJECTION, SOLUTION INTRAVENOUS; SUBCUTANEOUS at 17:57

## 2017-10-17 RX ADMIN — BUDESONIDE 500 MCG: 0.5 INHALANT RESPIRATORY (INHALATION) at 07:08

## 2017-10-17 RX ADMIN — FENOFIBRATE 145 MG: 145 TABLET ORAL at 17:58

## 2017-10-17 RX ADMIN — ZOLPIDEM TARTRATE 2.5 MG: 5 TABLET ORAL at 22:26

## 2017-10-17 RX ADMIN — BUDESONIDE 500 MCG: 0.5 INHALANT RESPIRATORY (INHALATION) at 19:32

## 2017-10-17 RX ADMIN — SITAGLIPTIN 25 MG: 25 TABLET, FILM COATED ORAL at 08:37

## 2017-10-17 RX ADMIN — HEPARIN SODIUM 5000 UNITS: 5000 INJECTION, SOLUTION INTRAVENOUS; SUBCUTANEOUS at 14:13

## 2017-10-17 RX ADMIN — Medication 10 ML: at 22:26

## 2017-10-17 NOTE — PROGRESS NOTES
Bedside and Verbal shift change report given to Angel Connolly RN (oncoming nurse) by Evan Fang RN (offgoing nurse). Report included the following information SBAR and Kardex.

## 2017-10-17 NOTE — PROGRESS NOTES
Problem: Falls - Risk of  Goal: *Absence of Falls  Document Breonna Fall Risk and appropriate interventions in the flowsheet.    Outcome: Progressing Towards Goal  Fall Risk Interventions:  Mobility Interventions: Bed/chair exit alarm, Patient to call before getting OOB           Medication Interventions: Bed/chair exit alarm, Patient to call before getting OOB, Teach patient to arise slowly, Utilize gait belt for transfers/ambulation, Evaluate medications/consider consulting pharmacy     Elimination Interventions: Call light in reach, Bed/chair exit alarm, Toileting schedule/hourly rounds, Urinal in reach, Patient to call for help with toileting needs

## 2017-10-17 NOTE — PROGRESS NOTES
Marquis Garcia [facility]  [facility], [address] [place] [address]  [phone]      Medical Progress Note      NAME: Johnson Mcdermott   :  8/10/1927  MRM:  [id number]    Date/Time: 10/17/2017  10:37 AM       Assessment and Plan:     LT leg weakness / lower back pain - He now refuses MRI of the L spine, for which he was admitted. It was ordered to R/O L spine issues causing his current problem. CT of the head is unremarkable. Pain control. Consult orthopedics, PT/OT. Fall precaution. DC home. Continue ASA.     DM (diabetes mellitus) with renal complications - Diabetic diet and counseling. SSI per protocol. Continue home glimepiride and sitagliptan. Check A1c.     CKD (chronic kidney disease) stage 3 - Unknown baseline creatinine, but now at stage 4. He is followed by nephrology as outpatient. Continue to monitor. Check US for obstruction related to spinal issues     COPD (chronic obstructive pulmonary disease) - Stable. Not wheezing. Continue home pulmicort, singulair, brovana and prn nebs.      Depression and anxiety - This prevented MRI, and likely contributes to his sensation of pain. Continue vilazodone and ambien. Hyperlipidemia - Continue atorvastatin and tricor. Subjective:     Chief Complaint:  Back pain is better, would like to go home. ROS:  (bold if positive, if negative)      Tolerating some PT  Tolerating Diet        Objective:     Last 24hrs VS reviewed since prior progress note.  Most recent are:    Visit Vitals    /51 (BP 1 Location: Right arm, BP Patient Position: At rest)    Pulse 83    Temp 97.9 °F (36.6 °C)    Resp 18    SpO2 95%     SpO2 Readings from Last 6 Encounters:   10/17/17 95%    O2 Flow Rate (L/min): 3 l/min     Intake/Output Summary (Last 24 hours) at 10/17/17 1037  Last data filed at 10/17/17 0622   Gross per 24 hour   Intake              120 ml   Output              650 ml   Net             -530 ml        Physical Exam:    Gen: Well-developed, well-nourished, in no acute distress  HEENT:  Pink conjunctivae, PERRL, hearing intact to voice, moist mucous membranes  Neck:  Supple, without masses, thyroid non-tender  Resp:  No accessory muscle use, clear breath sounds without wheezes rales or rhonchi  Card:  No murmurs, normal S1, S2 without thrills, bruits or peripheral edema  Abd:  Soft, non-tender, non-distended, normoactive bowel sounds are present, no mass  Lymph:  No cervical or inguinal adenopathy  Musc:  No cyanosis or clubbing, ROM limited by pain  Skin:  No rashes or ulcers, skin turgor is good  Neuro:  Cranial nerves are grossly intact, pain and LE motor weakness, follows commands vaguely  Psych:   Moderate insight, oriented to person, place and time, alert    Telemetry reviewed:   normal sinus rhythm  __________________________________________________________________  Medications Reviewed: (see below)  Medications:     Current Facility-Administered Medications   Medication Dose Route Frequency    sodium chloride 0.9 % bolus infusion 1,000 mL  1,000 mL IntraVENous ONCE    sodium chloride (NS) flush 5-10 mL  5-10 mL IntraVENous Q8H    sodium chloride (NS) flush 5-10 mL  5-10 mL IntraVENous PRN    dextrose (D50W) injection syrg 12.5-25 g  12.5-25 g IntraVENous PRN    aspirin chewable tablet 81 mg  81 mg Oral DAILY    atorvastatin (LIPITOR) tablet 10 mg  10 mg Oral DAILY    budesonide (PULMICORT) 500 mcg/2 ml nebulizer suspension  500 mcg Nebulization BID RT    fenofibrate nanocrystallized (TRICOR) tablet 145 mg  145 mg Oral QPM    glimepiride (AMARYL) tablet 2 mg  2 mg Oral DAILY WITH BREAKFAST    montelukast (SINGULAIR) tablet 10 mg  10 mg Oral QPM    SITagliptin (JANUVIA) tablet tab 25 mg  25 mg Oral DAILY WITH BREAKFAST    zolpidem (AMBIEN) tablet 2.5 mg  2.5 mg Oral QHS    vilazodone (VIIBRYD) tablet 20 mg  20 mg Oral QPM    sodium chloride (NS) flush 5-10 mL  5-10 mL IntraVENous Q8H    sodium chloride (NS) flush 5-10 mL  5-10 mL IntraVENous PRN    heparin (porcine) injection 5,000 Units  5,000 Units SubCUTAneous Q8H    insulin lispro (HUMALOG) injection   SubCUTAneous AC&HS    glucose chewable tablet 16 g  4 Tab Oral PRN    dextrose (D50W) injection syrg 12.5-25 g  12.5-25 g IntraVENous PRN    glucagon (GLUCAGEN) injection 1 mg  1 mg IntraMUSCular PRN    arformoterol (BROVANA) neb solution 15 mcg  15 mcg Nebulization BID RT    influenza vaccine 2017-18 (3 yrs+)(PF) (FLUZONE QUAD/FLUARIX QUAD) injection 0.5 mL  0.5 mL IntraMUSCular PRIOR TO DISCHARGE        Lab Data Reviewed: (see below)  Lab Review:     Recent Labs      10/16/17   1206   WBC  7.3   HGB  12.9   HCT  40.4   PLT  273     Recent Labs      10/16/17   1206   NA  143   K  4.5   CL  107   CO2  29   GLU  160*   BUN  39*   CREA  2.22*   CA  9.5   ALB  3.3*   TBILI  0.4   SGOT  14*   ALT  17   INR  1.2*     Lab Results   Component Value Date/Time    Glucose (POC) 114 10/17/2017 06:53 AM    Glucose (POC) 142 10/16/2017 09:07 PM     No results for input(s): PH, PCO2, PO2, HCO3, FIO2 in the last 72 hours. Recent Labs      10/16/17   1206   INR  1.2*     All Micro Results     None          I have reviewed notes of prior 24hr.     Other pertinent lab: none    Total time spent with patient: [address] [place] [address] [place] discussed with: Patient, Care Manager, Nursing Staff, Consultant/Specialist and >50% of time spent in counseling and coordination of care    Discussed:  Care Plan and D/C Planning    Prophylaxis:  H2B/PPI    Disposition:   PT, OT, RN           ___________________________________________________    Attending Physician: Lillie Blackmon MD

## 2017-10-17 NOTE — PROGRESS NOTES
Bedside shift change report given to Mandie Ferrari RN (oncoming nurse) by Peter Alatorre RN (offgoing nurse). Report included the following information SBAR, Kardex and MAR.

## 2017-10-17 NOTE — PROGRESS NOTES
Problem: Falls - Risk of  Goal: *Absence of Falls  Document Breonna Fall Risk and appropriate interventions in the flowsheet. Outcome: Progressing Towards Goal  Fall Risk Interventions:  Mobility Interventions: Bed/chair exit alarm, Patient to call before getting OOB           Medication Interventions: Bed/chair exit alarm, Patient to call before getting OOB     Elimination Interventions: Call light in reach, Bed/chair exit alarm, Toileting schedule/hourly rounds, Urinal in reach, Patient to call for help with toileting needs        To call out for assistance; states that he does not feel comfortable getting up by himself at this point anyway, but that he will call.

## 2017-10-17 NOTE — PROGRESS NOTES
Problem: Mobility Impaired (Adult and Pediatric)  Goal: *Acute Goals and Plan of Care (Insert Text)  Physical Therapy Goals  Initiated 10/16/2017  1. Patient will move from supine to sit and sit to supine in bed with independence within 7 day(s). 2. Patient will transfer from bed to chair and chair to bed with modified independence using the least restrictive device within 7 day(s). 3. Patient will perform sit to stand with modified independence within 7 day(s). 4. Patient will ambulate with modified independence for 150 feet with the least restrictive device within 7 day(s). 5. Patient will ascend/descend 2 stairs with one handrail(s) with minimal assistance/contact guard assist within 7 day(s). PHYSICAL THERAPY TREATMENT  Patient: Loren Lawler (48 y.o. male)  Date: 10/17/2017  Diagnosis: Weakness  Weakness <principal problem not specified>       Precautions: Fall      ASSESSMENT:  Patient agreeable to attempt ambulation despite c/o back pain and knee instability. Patient educated on logroll technique for bed mobility for decreased back strain, required min assist x 1 to complete. Patient then stood at [email or web address] with min assist x 2 for safety; posterior lean initially with tactile cues to correct. Patient able to take a few steps over to the chair using RW with min assist x 2 for safety; bilateral knee instability noted. Patient unable to ambulate further due to knees feeling \"wobbly. \" Patient left up in chair at end of session. Patient will likely need rehab at discharge due to significantly impaired mobility below baseline level of function.  Will continue to follow to progress mobility while patient remains in hospital.   Progression toward goals:  [ ]    Improving appropriately and progressing toward goals  [X]    Improving slowly and progressing toward goals  [ ]    Not making progress toward goals and plan of care will be adjusted       PLAN:  Patient continues to benefit from skilled intervention to address the above impairments. Continue treatment per established plan of care. Discharge Recommendations:  Rehab  Further Equipment Recommendations for Discharge:  TBD       SUBJECTIVE:   Patient stated The pain is better, but my legs feel so weak.       OBJECTIVE DATA SUMMARY:   Critical Behavior:  Neurologic State: Alert, Appropriate for age, Eyes open spontaneously  Orientation Level: Oriented X4  Cognition: Appropriate decision making, Appropriate for age attention/concentration, Appropriate safety awareness, Follows commands  Safety/Judgement: Awareness of environment, Insight into deficits  Functional Mobility Training:  Bed Mobility:  Rolling: Contact guard assistance  Supine to Sit: Minimum assistance;Assist x1  Sit to Supine:  (NT - OOB to chair)           Transfers:  Sit to Stand: Minimum assistance; Additional time;Assist x2 (posterior lean upon initial stance)  Stand to Sit: Minimum assistance;Assist x2        Bed to Chair: Minimum assistance;Assist x2                    Balance:  Sitting: Intact; With support  Standing: Impaired; With support  Standing - Static: Fair  Standing - Dynamic : Fair  Ambulation/Gait Training:  Distance (ft): 3 Feet (ft) (took a few steps to the chair)  Assistive Device: Gait belt;Walker, rolling  Ambulation - Level of Assistance: Minimal assistance; Additional time;Assist x2     Gait Description (WDL): Exceptions to WDL  Gait Abnormalities: Antalgic;Decreased step clearance (decreased stability bilateral knees)        Base of Support: Widened     Speed/Reyna: Pace decreased (<100 feet/min); Shuffled  Step Length: Left shortened;Right shortened           Therapeutic Exercises:   Encouraged seated ex's for LE strengthening   Pain:  Pain Scale 1: Numeric (0 - 10)  Pain Intensity 1: 0              Activity Tolerance:   VSS  Please refer to the flowsheet for vital signs taken during this treatment.   After treatment:   [X]    Patient left in no apparent distress sitting up in chair  [ ] Patient left in no apparent distress in bed  [X]    Call bell left within reach  [X]    Nursing notified  [ ]    Caregiver present  [X]    Bed alarm activated      COMMUNICATION/COLLABORATION:   The patients plan of care was discussed with: Occupational Therapist and Registered Nurse     Sheree Blanc, PT   Time Calculation: 20 mins

## 2017-10-17 NOTE — PROGRESS NOTES
Primary Nurse Mariah Dalal RN and Ellwood Ganser, RN performed a dual skin assessment on this patient No impairment noted  Michel score is 18

## 2017-10-17 NOTE — PROGRESS NOTES
Called floor to see if patient is able to come to MRI, unsuccessful attempt 10/16/17. RN Enoch [place]) unavailable and RN Charge unavailable as they are assisting other patients. There is availability now to scan patient if can travel to MRI.

## 2017-10-17 NOTE — ROUTINE PROCESS
Code 44 printed and placed in pt's paper chart per utilization review.  Tyrese Carlson, [id number] Specialist, [phone]

## 2017-10-17 NOTE — PROGRESS NOTES
CM Note:  Met with pt for d/c planning. PTA pt was independent with ADL's and was ambulatory with a cane. He no longer drives and relies on his daughters for transport. Other DME at home:  chair, shower chair. He has home O2 and a nebulizer from [address]. He has never had home health. He had rehab in [place] years ago when he had knee replacements. His emergency contact is his daugther, Rosio Schmitt ([phone]), who will drive him home at d/c. Pt has Rx coverage and gets his medications from [employer].  Will continue to follow and assist with any needs prior to d/c. SARITHA Fiore RN    Care Management Interventions  PCP Verified by CM:  Yes (PCP is Dr. Jona Benton.)  Palliative Care Criteria Met (RRAT>21 & CHF Dx)?: No  MyChart Signup: No  Discharge Durable Medical Equipment: No  Physical Therapy Consult: Yes  Occupational Therapy Consult: Yes  Speech Therapy Consult: No  Current Support Network: Relative's Home (Pt lives with his 2 daughters in a 1 story house with 2 entry steps.)  Confirm Follow Up Transport: Family (Pt's daughter to drive him home at d/c.)  Plan discussed with Pt/Family/Caregiver: Yes  Discharge Location  Discharge Placement: Home with one level

## 2017-10-17 NOTE — PROGRESS NOTES
I met with Mr Isaías Mireles and his Daughter Ag Tanner for case management consult for home health, Mr Isaías Mireles has not had home care before, and he feels he not able to transfer himself, and need assistance with ADL,s and meals. His daughter stated she and her sister  Can provide some care but feels it is not safe for him to go home at this point unless they have 24 hour care. Mr Isaías Mireles also reported that he is claustraphobic and would like try and have \"open MRI if possible\". Discussed with nurse Becca Mullen.

## 2017-10-17 NOTE — PROGRESS NOTES
Full note dictated    Impression: lumbar radiculopathy    Plan: 1. Agree with MRI of lumbosacral spine to evaluate for spinal stenosis and or disc herniation. 2. Consider course of steroids for radiculitis. 3. Continue to attempt to mobilize with PT.  4. Thank you for interesting consult.

## 2017-10-17 NOTE — PROGRESS NOTES
1830: patient arrived to floor around 1800 from ED. Vital signs stable and patient alert and oriented x4. No facial droop numbness or tingling, Left side of body within patients baseline. Righ arm weak and Right leg weak. O2 2 liters placed on patient and urinal used. Skin intact with just some pink around his buttocks but blanchable. Pillow placed under buttocks.

## 2017-10-18 LAB
GLUCOSE BLD STRIP.AUTO-MCNC: 100 MG/DL (ref 65–100)
GLUCOSE BLD STRIP.AUTO-MCNC: 110 MG/DL (ref 65–100)
GLUCOSE BLD STRIP.AUTO-MCNC: 200 MG/DL (ref 65–100)
GLUCOSE BLD STRIP.AUTO-MCNC: 233 MG/DL (ref 65–100)
SERVICE CMNT-IMP: ABNORMAL
SERVICE CMNT-IMP: NORMAL

## 2017-10-18 PROCEDURE — 74011250637 HC RX REV CODE- 250/637: Performed by: INTERNAL MEDICINE

## 2017-10-18 PROCEDURE — 99218 HC RM OBSERVATION: CPT

## 2017-10-18 PROCEDURE — 94640 AIRWAY INHALATION TREATMENT: CPT

## 2017-10-18 PROCEDURE — 74011250636 HC RX REV CODE- 250/636: Performed by: PHYSICIAN ASSISTANT

## 2017-10-18 PROCEDURE — 74011250636 HC RX REV CODE- 250/636: Performed by: INTERNAL MEDICINE

## 2017-10-18 PROCEDURE — 96372 THER/PROPH/DIAG INJ SC/IM: CPT

## 2017-10-18 PROCEDURE — 82962 GLUCOSE BLOOD TEST: CPT

## 2017-10-18 PROCEDURE — 97530 THERAPEUTIC ACTIVITIES: CPT

## 2017-10-18 PROCEDURE — 74011636637 HC RX REV CODE- 636/637: Performed by: INTERNAL MEDICINE

## 2017-10-18 PROCEDURE — 77010033678 HC OXYGEN DAILY

## 2017-10-18 PROCEDURE — 97116 GAIT TRAINING THERAPY: CPT

## 2017-10-18 PROCEDURE — 74011000250 HC RX REV CODE- 250: Performed by: INTERNAL MEDICINE

## 2017-10-18 RX ORDER — METHYLPREDNISOLONE 4 MG/1
4 TABLET ORAL ONCE
Status: COMPLETED | OUTPATIENT
Start: 2017-10-18 | End: 2017-10-18

## 2017-10-18 RX ORDER — METHYLPREDNISOLONE 4 MG/1
4 TABLET ORAL
Status: DISCONTINUED | OUTPATIENT
Start: 2017-10-22 | End: 2017-10-20 | Stop reason: HOSPADM

## 2017-10-18 RX ORDER — METHYLPREDNISOLONE 4 MG/1
4 TABLET ORAL
Status: COMPLETED | OUTPATIENT
Start: 2017-10-19 | End: 2017-10-19

## 2017-10-18 RX ORDER — METHYLPREDNISOLONE 4 MG/1
8 TABLET ORAL ONCE
Status: COMPLETED | OUTPATIENT
Start: 2017-10-18 | End: 2017-10-18

## 2017-10-18 RX ORDER — METHYLPREDNISOLONE 4 MG/1
TABLET ORAL
Status: DISCONTINUED | OUTPATIENT
Start: 2017-10-18 | End: 2017-10-18 | Stop reason: CLARIF

## 2017-10-18 RX ORDER — METHYLPREDNISOLONE 4 MG/1
4 TABLET ORAL
Status: DISCONTINUED | OUTPATIENT
Start: 2017-10-23 | End: 2017-10-20 | Stop reason: HOSPADM

## 2017-10-18 RX ORDER — METHYLPREDNISOLONE 4 MG/1
4 TABLET ORAL
Status: DISCONTINUED | OUTPATIENT
Start: 2017-10-21 | End: 2017-10-20 | Stop reason: HOSPADM

## 2017-10-18 RX ORDER — METHYLPREDNISOLONE 4 MG/1
8 TABLET ORAL ONCE
Status: COMPLETED | OUTPATIENT
Start: 2017-10-19 | End: 2017-10-19

## 2017-10-18 RX ORDER — METHYLPREDNISOLONE 4 MG/1
4 TABLET ORAL
Status: DISCONTINUED | OUTPATIENT
Start: 2017-10-20 | End: 2017-10-20 | Stop reason: HOSPADM

## 2017-10-18 RX ADMIN — INSULIN LISPRO 3 UNITS: 100 INJECTION, SOLUTION INTRAVENOUS; SUBCUTANEOUS at 11:50

## 2017-10-18 RX ADMIN — GLIMEPIRIDE 2 MG: 2 TABLET ORAL at 08:35

## 2017-10-18 RX ADMIN — HEPARIN SODIUM 5000 UNITS: 5000 INJECTION, SOLUTION INTRAVENOUS; SUBCUTANEOUS at 05:19

## 2017-10-18 RX ADMIN — Medication 10 ML: at 14:00

## 2017-10-18 RX ADMIN — METHYLPREDNISOLONE 4 MG: 4 TABLET ORAL at 13:38

## 2017-10-18 RX ADMIN — METHYLPREDNISOLONE 4 MG: 4 TABLET ORAL at 18:00

## 2017-10-18 RX ADMIN — ZOLPIDEM TARTRATE 2.5 MG: 5 TABLET ORAL at 21:34

## 2017-10-18 RX ADMIN — FENOFIBRATE 145 MG: 145 TABLET ORAL at 17:59

## 2017-10-18 RX ADMIN — ARFORMOTEROL TARTRATE 15 MCG: 15 SOLUTION RESPIRATORY (INHALATION) at 07:40

## 2017-10-18 RX ADMIN — METHYLPREDNISOLONE 8 MG: 4 TABLET ORAL at 13:38

## 2017-10-18 RX ADMIN — Medication 10 ML: at 05:19

## 2017-10-18 RX ADMIN — BUDESONIDE 500 MCG: 0.5 INHALANT RESPIRATORY (INHALATION) at 19:24

## 2017-10-18 RX ADMIN — INSULIN LISPRO 2 UNITS: 100 INJECTION, SOLUTION INTRAVENOUS; SUBCUTANEOUS at 21:49

## 2017-10-18 RX ADMIN — BUDESONIDE 500 MCG: 0.5 INHALANT RESPIRATORY (INHALATION) at 07:40

## 2017-10-18 RX ADMIN — HEPARIN SODIUM 5000 UNITS: 5000 INJECTION, SOLUTION INTRAVENOUS; SUBCUTANEOUS at 21:33

## 2017-10-18 RX ADMIN — ARFORMOTEROL TARTRATE 15 MCG: 15 SOLUTION RESPIRATORY (INHALATION) at 19:24

## 2017-10-18 RX ADMIN — ATORVASTATIN CALCIUM 10 MG: 10 TABLET, FILM COATED ORAL at 08:35

## 2017-10-18 RX ADMIN — METHYLPREDNISOLONE 8 MG: 4 TABLET ORAL at 21:33

## 2017-10-18 RX ADMIN — ASPIRIN 81 MG 81 MG: 81 TABLET ORAL at 08:35

## 2017-10-18 RX ADMIN — Medication 10 ML: at 21:35

## 2017-10-18 RX ADMIN — HEPARIN SODIUM 5000 UNITS: 5000 INJECTION, SOLUTION INTRAVENOUS; SUBCUTANEOUS at 13:37

## 2017-10-18 RX ADMIN — VILAZODONE HYDROCHLORIDE 20 MG: 20 TABLET ORAL at 18:00

## 2017-10-18 RX ADMIN — SITAGLIPTIN 25 MG: 25 TABLET, FILM COATED ORAL at 08:35

## 2017-10-18 RX ADMIN — MONTELUKAST SODIUM 10 MG: 10 TABLET, FILM COATED ORAL at 17:59

## 2017-10-18 NOTE — PROGRESS NOTES
[facility] [address]  [address] [place] [address], [address] [place] [address]  [phone]      Medical Progress Note      NAME: Mica Diaz   :  8/10/1927  MRM:  [id number]    Date/Time: 10/18/2017  11:50 AM       Assessment and Plan:     LT leg weakness / lower back pain - He now refuses MRI of the L spine, for which he was admitted. He can have an open MRI as outpatient. It was ordered to R/O L spine issues causing his current problem. CT of the head is unremarkable. Pain is better controlled, but he feels he cannot return home. Consulted orthopedics, PT/OT. Fall precaution. DC to rehab vs SNF. Continue ASA. His discharge is delayed since case management all were on leave this AM.     DM (diabetes mellitus) with renal complications - Diabetic diet and counseling. SSI per protocol. Continue home glimepiride and sitagliptan. Check A1c.     CKD (chronic kidney disease) stage 3 - Unknown baseline creatinine, but now at stage 4. He is followed by nephrology as outpatient. Continue to monitor. Check US for obstruction related to spinal issues     COPD (chronic obstructive pulmonary disease) - Stable. Not wheezing. Continue home pulmicort, singulair, brovana and prn nebs.      Depression and anxiety - This prevented MRI, and likely contributes to his sensation of pain. Continue vilazodone and ambien. Hyperlipidemia - Continue atorvastatin and tricor. Subjective:     Chief Complaint:  Back pain is better, would like to go to rehab. ROS:  (bold if positive, if negative)      Tolerating some PT  Tolerating Diet        Objective:     Last 24hrs VS reviewed since prior progress note.  Most recent are:    Visit Vitals    /77 (BP 1 Location: Left arm, BP Patient Position: At rest)    Pulse 83    Temp 98 °F (36.7 °C)    Resp 16    SpO2 97%     SpO2 Readings from Last 6 Encounters:   10/18/17 97%    O2 Flow Rate (L/min): 2 l/min       Intake/Output Summary (Last 24 hours) at 10/18/17 1150  Last data filed at 10/18/17 1058   Gross per 24 hour   Intake              240 ml   Output             1515 ml   Net            -1275 ml        Physical Exam:    Gen:  Well-developed, well-nourished, in no acute distress  HEENT:  Pink conjunctivae, PERRL, hearing intact to voice, moist mucous membranes  Neck:  Supple, without masses, thyroid non-tender  Resp:  No accessory muscle use, clear breath sounds without wheezes rales or rhonchi  Card:  No murmurs, normal S1, S2 without thrills, bruits or peripheral edema  Abd:  Soft, non-tender, non-distended, normoactive bowel sounds are present, no mass  Lymph:  No cervical or inguinal adenopathy  Musc:  No cyanosis or clubbing, ROM limited by pain  Skin:  No rashes or ulcers, skin turgor is good  Neuro:  Cranial nerves are grossly intact, pain and LE motor weakness, follows commands vaguely  Psych:   Moderate insight, oriented to person, place and time, alert    Telemetry reviewed:   normal sinus rhythm  __________________________________________________________________  Medications Reviewed: (see below)  Medications:     Current Facility-Administered Medications   Medication Dose Route Frequency    sodium chloride (NS) flush 5-10 mL  5-10 mL IntraVENous Q8H    sodium chloride (NS) flush 5-10 mL  5-10 mL IntraVENous PRN    dextrose (D50W) injection syrg 12.5-25 g  12.5-25 g IntraVENous PRN    aspirin chewable tablet 81 mg  81 mg Oral DAILY    atorvastatin (LIPITOR) tablet 10 mg  10 mg Oral DAILY    budesonide (PULMICORT) 500 mcg/2 ml nebulizer suspension  500 mcg Nebulization BID RT    fenofibrate nanocrystallized (TRICOR) tablet 145 mg  145 mg Oral QPM    montelukast (SINGULAIR) tablet 10 mg  10 mg Oral QPM    SITagliptin (JANUVIA) tablet tab 25 mg  25 mg Oral DAILY WITH BREAKFAST    zolpidem (AMBIEN) tablet 2.5 mg  2.5 mg Oral QHS    vilazodone (VIIBRYD) tablet 20 mg  20 mg Oral QPM    sodium chloride (NS) flush 5-10 mL  5-10 mL IntraVENous Q8H    sodium chloride (NS) flush 5-10 mL  5-10 mL IntraVENous PRN    heparin (porcine) injection 5,000 Units  5,000 Units SubCUTAneous Q8H    insulin lispro (HUMALOG) injection   SubCUTAneous AC&HS    glucose chewable tablet 16 g  4 Tab Oral PRN    dextrose (D50W) injection syrg 12.5-25 g  12.5-25 g IntraVENous PRN    glucagon (GLUCAGEN) injection 1 mg  1 mg IntraMUSCular PRN    arformoterol (BROVANA) neb solution 15 mcg  15 mcg Nebulization BID RT    influenza vaccine 2017-18 (3 yrs+)(PF) (FLUZONE QUAD/FLUARIX QUAD) injection 0.5 mL  0.5 mL IntraMUSCular PRIOR TO DISCHARGE        Lab Data Reviewed: (see below)  Lab Review:     Recent Labs      10/16/17   1206   WBC  7.3   HGB  12.9   HCT  40.4   PLT  273     Recent Labs      10/16/17   1206   NA  143   K  4.5   CL  107   CO2  29   GLU  160*   BUN  39*   CREA  2.22*   CA  9.5   ALB  3.3*   TBILI  0.4   SGOT  14*   ALT  17   INR  1.2*     Lab Results   Component Value Date/Time    Glucose (POC) 200 10/18/2017 10:56 AM    Glucose (POC) 100 10/18/2017 07:25 AM    Glucose (POC) 98 10/17/2017 10:14 PM    Glucose (POC) 61 10/17/2017 09:24 PM    Glucose (POC) 156 10/17/2017 04:38 PM     No results for input(s): PH, PCO2, PO2, HCO3, FIO2 in the last 72 hours. Recent Labs      10/16/17   1206   INR  1.2*     All Micro Results     None          I have reviewed notes of prior 24hr.     Other pertinent lab: none    Total time spent with patient: [address] [place] [address] discussed with: Patient, Care Manager, Nursing Staff, Consultant/Specialist and >50% of time spent in counseling and coordination of care    Discussed:  Care Plan and D/C Planning    Prophylaxis:  H2B/PPI    Disposition:   PT, OT, RN           ___________________________________________________    Attending Physician: Yunior Gonzalez MD

## 2017-10-18 NOTE — PROGRESS NOTES
Bedside shift change report given to Luiz Jenkins RN (oncoming nurse) by Jessica Valle RN (offgoing nurse). Report included the following information SBAR, Kardex and MAR.

## 2017-10-18 NOTE — PROGRESS NOTES
Problem: Falls - Risk of  Goal: *Absence of Falls  Document Breonna Fall Risk and appropriate interventions in the flowsheet. Outcome: Progressing Towards Goal  Fall Risk Interventions:  Mobility Interventions: PT Consult for mobility concerns, PT Consult for assist device competence         Medication Interventions: Patient to call before getting OOB    Elimination Interventions: Call light in reach, Patient to call for help with toileting needs     To call out for assistance, but understands that he is having a hard time physically with getting up.

## 2017-10-18 NOTE — DIABETES MGMT
DTC Progress Note    Recommendations/ Comments: Chart reviewed d/t hypoglycemic event yesterday. If appropriate, please consider discontinuing Amaryl as pt is already receiving correctional Lispro. Cr is elevated, further increasing pts risk of hypoglycemia, along with his age. Please also add A1c to next lab draw, if not already done, to better assess pts control PTA. Chart reviewed on Geno Brantley. Patient is a 80 y.o. male with known Type 2 Diabetes on Januvia 100 mg/d and Amaryl 2 mg/d at home. A1c:   No results found for: HBA1C, HGBE8, ZQE9WLGC    Recent Glucose Results: Lab Results   Component Value Date/Time    GLUCPOC 100 10/18/2017 07:25 AM    GLUCPOC 98 10/17/2017 10:14 PM    GLUCPOC 61 (L) 10/17/2017 09:24 PM        Lab Results   Component Value Date/Time    Creatinine 2.22 10/16/2017 12:06 PM     Estimated Creatinine Clearance: 25.3 mL/min (based on Cr of 2.22). Active Orders   Diet    DIET DIABETIC CONSISTENT CARB Regular        PO intake: Patient Vitals for the past 72 hrs:   % Diet Eaten   10/18/17 0838 100 %   10/17/17 0830 85 %       Current hospital DM medication: Januvia 25 mg/d; Amaryl 2 mg/d; correctional Lispro-normal sensitivity     Will continue to follow as needed. Thank you  Ana Powell RD, CDE

## 2017-10-18 NOTE — PROGRESS NOTES
Problem: Mobility Impaired (Adult and Pediatric)  Goal: *Acute Goals and Plan of Care (Insert Text)  Physical Therapy Goals  Initiated 10/16/2017  1. Patient will move from supine to sit and sit to supine  in bed with independence within 7 day(s). 2.  Patient will transfer from bed to chair and chair to bed with modified independence using the least restrictive device within 7 day(s). 3.  Patient will perform sit to stand with modified independence within 7 day(s). 4.  Patient will ambulate with modified independence for 150 feet with the least restrictive device within 7 day(s). 5.  Patient will ascend/descend 2 stairs with one handrail(s) with minimal assistance/contact guard assist within 7 day(s). physical Therapy TREATMENT  Patient: Maurice Padron (63 y.o. male)  Date: 10/18/2017  Diagnosis: Weakness  Weakness <principal problem not specified>       Precautions: Fall    ASSESSMENT:  Patient agreeable to attempt gait training and reports no back pain at this time. Reviewed logroll technique for bed mobility for decreased back strain, performed with supervision of one. Patient then transfer training of sit <> stand with decreased assist needed today (contact guard) and cues for hand placement especially with stand to sit. Performed gait training with rolling walker with 40 feet with min assist of two. No buckling noted but patient with increased weight bearing on walker, he feels his leg will buckle if he decreases walker use. Patient taking several rest breaks during gait, at times leaninig on his elbows. Reviewed safety with walker use. Patient left up in chair at end of session. Testing bilateral leg strength in sitting - patient with left leg weakness, greater proximally. Daughter in at end of treatment session, she has many questions regarding his need for an MRI and for a diagnosis of his back pain.   Patient will likely need rehab at discharge due to significantly impaired mobility below baseline level of function. Will continue to follow to progress mobility while patient remains in hospital.   Progression toward goals:  [x]    Improving appropriately and progressing toward goals  []    Improving slowly and progressing toward goals  []    Not making progress toward goals and plan of care will be adjusted     PLAN:  Patient continues to benefit from skilled intervention to address the above impairments. Continue treatment per established plan of care. Discharge Recommendations:  Rehab  Further Equipment Recommendations for Discharge:  rolling walker     SUBJECTIVE:   Patient stated I don't have any pain.     OBJECTIVE DATA SUMMARY:   Critical Behavior:  Neurologic State: Alert, Appropriate for age, Eyes open spontaneously  Orientation Level: Oriented X4  Cognition: Appropriate decision making, Appropriate for age attention/concentration, Appropriate safety awareness, Follows commands  Safety/Judgement: Awareness of environment, Insight into deficits  Functional Mobility Training:  Bed Mobility:                    Transfers:                                   Balance:     Ambulation/Gait Training:  Distance (ft): 40 Feet (ft)  Assistive Device: Walker, rolling;Gait belt  Ambulation - Level of Assistance: Minimal assistance;Assist x2        Gait Abnormalities:  (increased weight bearing on the walker decreased on left leg)                                      Stairs:              Pain:  Pain Scale 1: Numeric (0 - 10)  Pain Intensity 1: 0              Activity Tolerance:   No pain throughout treatment  Please refer to the flowsheet for vital signs taken during this treatment.   After treatment:   [x]    Patient left in no apparent distress sitting up in chair  []    Patient left in no apparent distress in bed  [x]    Call bell left within reach  [x]    Nursing notified  [x]    Caregiver present  [x]    Chair alarm activated    COMMUNICATION/COLLABORATION:   The patients plan of care was discussed with: Registered Nurse and     Tony Man, PT   Time Calculation: 23 mins

## 2017-10-18 NOTE — CONSULTS
[facility]   [address] [facility], [address] [facility]   [facility]       Name:  Leif Reynoso   MR#:  [id number]   :  08/10/1927   Account #:  [de-identified]    Date of Consultation:  10/17/2017   Date of Adm:  10/16/2017       REASON FOR CONSULTATION: I was asked to see this patient for   left lower extremity weakness by Dr. Kathie Cha. HISTORY OF PRESENT ILLNESS: The patient is a 12-year-old white   male who reports he has had 2 weeks of low back pain and then 2   days ago when he tried to stand and walk across the floor, he had left   leg weakness and was unable to walk. The patient has a history of   diabetes mellitus, chronic kidney disease, hypertension,   hyperlipidemia, COPD. He was brought to the emergency room   complaining of low back pain and left leg weakness. He says he now   does not have any low back pain, but he continued to have the leg   weakness even today with attempts to walk with physical therapy. The   patient reports he is otherwise fairly sedentary, sits and reads a lot. As   far as low back pain, he did have back pain as far as a few months ago   and sometimes the low back pain is sharp and on the left side. His leg   weakness was a sudden onset. He denies any bowel or bladder   changes. He denies any trauma. He is not having any upper extremity   discomfort or weakness or numbness. PAST MEDICAL HISTORY: COPD, diabetes. PAST SURGICAL HISTORY: Cholecystectomy, bilateral cataract   surgery and bilateral total knee arthroplasty. SOCIAL HISTORY: Former 1 pack per day smoker, quit in . Alcohol use none. FAMILY HISTORY: Diabetes on the mother's side. CURRENT MEDICATIONS:    1. Glucagon injection. 2. Glucose chewable tablet. 3. Januvia. 4. Amaryl. 5. Lipitor. [address] 7. Pulmicort. 8. Heparin injectable. 9. Humalog injectable. 10. Singulair. 11. Tricor. 12. Ambien.      PHYSICAL EXAMINATION   VITAL SIGNS: Currently he has a temperature of 97.9, pulse 84, BP   140/72, respirations 17. GENERAL: He is alert and oriented x3, no acute distress, lying in bed,   appearing comfortable. EXTREMITIES: Examination of the bilateral upper extremities reveals   full range of motion, normal strength throughout the muscle groups. HEENT: Normocephalic, atraumatic. NECK: Supple, nontender. CHEST: Normal respirations. ABDOMEN: Soft, nontender. BILATERAL LOWER EXTREMITIES: Reveals he is able to raise both   legs off the bed, able to dorsiflex and plantar flex the feet. Strength   testing on the right is 5/5, strength testing on the left is 5-/5 throughout   all muscle groups. He has good sensation to light touch distally in the   foot and all nerve distributions. He has 2+ posterior tibialis pulses   bilaterally. ASSESSMENT: A 58-year-old white male who has had long history of   chronic low back pain and had sudden weakness in the left lower   extremity that seems to have improved a bit. His back pain is definitely   decreased. PLAN:     1. Continue to mobilize with physical therapy. 2. I agree with the MRI of the lumbosacral spine to evaluate for   stenosis or herniated disk. 3. I would recommend considering a steroid course for his   radiculopathy. 4. We will continue to follow him with you while in the hospital. I may   have the orthopedic spine surgeon also put his input in to the   evaluation and consult.          MD DOUG Perez / Hemant Gomez   D:  10/17/2017   19:33   T:  10/17/2017   19:59   Job #:  [id number]

## 2017-10-18 NOTE — PROGRESS NOTES
CM Note:  Met with pt and his daughter, Jessica Elise. Order for [facility] rehab noted. Family is frustrated that they still don't know what is causing pt's problem and feel hesitant to have him go anywhere until they know what it is.  Jessica Elise will discuss his dispo options with her daughter and doesn't want any referrals sent at this time.   ERI Joseph

## 2017-10-18 NOTE — PROGRESS NOTES
ORTHO:    Discussed case with Dr. Zuri Jacobsen. Pt. Symptoms improving, unable to tolerate MRI here. Medicine has cancelled MRI. No need for MRI if symptoms improving. PO steroid dosepack and outpatient follow up.       Iraj Smith PA-C  Orthopaedic Surgery [address]

## 2017-10-18 NOTE — PROGRESS NOTES
Bedside shift change report given to Vidya Rose RN (oncoming nurse) by Mt Hernandez RN (offgoing nurse). Report included the following information SBAR, Kardex and MAR.

## 2017-10-19 ENCOUNTER — APPOINTMENT (OUTPATIENT)
Dept: MRI IMAGING | Age: 82
End: 2017-10-19
Attending: INTERNAL MEDICINE
Payer: MEDICARE

## 2017-10-19 LAB
ANION GAP SERPL CALC-SCNC: 8 MMOL/L (ref 5–15)
BUN SERPL-MCNC: 38 MG/DL (ref 6–20)
BUN/CREAT SERPL: 20 (ref 12–20)
CALCIUM SERPL-MCNC: 9.3 MG/DL (ref 8.5–10.1)
CHLORIDE SERPL-SCNC: 105 MMOL/L (ref 97–108)
CO2 SERPL-SCNC: 27 MMOL/L (ref 21–32)
CREAT SERPL-MCNC: 1.89 MG/DL (ref 0.7–1.3)
ERYTHROCYTE [DISTWIDTH] IN BLOOD BY AUTOMATED COUNT: 13.4 % (ref 11.5–14.5)
GLUCOSE BLD STRIP.AUTO-MCNC: 125 MG/DL (ref 65–100)
GLUCOSE BLD STRIP.AUTO-MCNC: 131 MG/DL (ref 65–100)
GLUCOSE BLD STRIP.AUTO-MCNC: 144 MG/DL (ref 65–100)
GLUCOSE BLD STRIP.AUTO-MCNC: 160 MG/DL (ref 65–100)
GLUCOSE SERPL-MCNC: 186 MG/DL (ref 65–100)
HCT VFR BLD AUTO: 37.6 % (ref 36.6–50.3)
HGB BLD-MCNC: 11.8 G/DL (ref 12.1–17)
MAGNESIUM SERPL-MCNC: 2.4 MG/DL (ref 1.6–2.4)
MCH RBC QN AUTO: 28.6 PG (ref 26–34)
MCHC RBC AUTO-ENTMCNC: 31.4 G/DL (ref 30–36.5)
MCV RBC AUTO: 91 FL (ref 80–99)
PLATELET # BLD AUTO: 258 K/UL (ref 150–400)
POTASSIUM SERPL-SCNC: 4.4 MMOL/L (ref 3.5–5.1)
RBC # BLD AUTO: 4.13 M/UL (ref 4.1–5.7)
SERVICE CMNT-IMP: ABNORMAL
SODIUM SERPL-SCNC: 140 MMOL/L (ref 136–145)
WBC # BLD AUTO: 7.7 K/UL (ref 4.1–11.1)

## 2017-10-19 PROCEDURE — 74011250636 HC RX REV CODE- 250/636: Performed by: INTERNAL MEDICINE

## 2017-10-19 PROCEDURE — 74011250636 HC RX REV CODE- 250/636: Performed by: PHYSICIAN ASSISTANT

## 2017-10-19 PROCEDURE — 77010033678 HC OXYGEN DAILY

## 2017-10-19 PROCEDURE — 85027 COMPLETE CBC AUTOMATED: CPT | Performed by: INTERNAL MEDICINE

## 2017-10-19 PROCEDURE — 83735 ASSAY OF MAGNESIUM: CPT | Performed by: INTERNAL MEDICINE

## 2017-10-19 PROCEDURE — 74011250637 HC RX REV CODE- 250/637: Performed by: INTERNAL MEDICINE

## 2017-10-19 PROCEDURE — 36415 COLL VENOUS BLD VENIPUNCTURE: CPT | Performed by: INTERNAL MEDICINE

## 2017-10-19 PROCEDURE — 72148 MRI LUMBAR SPINE W/O DYE: CPT

## 2017-10-19 PROCEDURE — 82962 GLUCOSE BLOOD TEST: CPT

## 2017-10-19 PROCEDURE — 80048 BASIC METABOLIC PNL TOTAL CA: CPT | Performed by: INTERNAL MEDICINE

## 2017-10-19 PROCEDURE — 74011636637 HC RX REV CODE- 636/637: Performed by: INTERNAL MEDICINE

## 2017-10-19 PROCEDURE — 94640 AIRWAY INHALATION TREATMENT: CPT

## 2017-10-19 PROCEDURE — 99218 HC RM OBSERVATION: CPT

## 2017-10-19 PROCEDURE — 96372 THER/PROPH/DIAG INJ SC/IM: CPT

## 2017-10-19 PROCEDURE — 96374 THER/PROPH/DIAG INJ IV PUSH: CPT

## 2017-10-19 PROCEDURE — 74011000250 HC RX REV CODE- 250: Performed by: INTERNAL MEDICINE

## 2017-10-19 RX ORDER — POLYETHYLENE GLYCOL 3350 17 G/17G
17 POWDER, FOR SOLUTION ORAL DAILY
Status: DISCONTINUED | OUTPATIENT
Start: 2017-10-19 | End: 2017-10-20 | Stop reason: HOSPADM

## 2017-10-19 RX ORDER — LORAZEPAM 2 MG/ML
2 INJECTION INTRAMUSCULAR AS NEEDED
Status: DISCONTINUED | OUTPATIENT
Start: 2017-10-19 | End: 2017-10-20 | Stop reason: HOSPADM

## 2017-10-19 RX ORDER — METHYLPREDNISOLONE 4 MG/1
TABLET ORAL
Qty: 6 TAB | Refills: 0 | Status: SHIPPED | OUTPATIENT
Start: 2017-10-19

## 2017-10-19 RX ORDER — AMOXICILLIN 250 MG
1 CAPSULE ORAL DAILY
Status: DISCONTINUED | OUTPATIENT
Start: 2017-10-19 | End: 2017-10-20 | Stop reason: HOSPADM

## 2017-10-19 RX ADMIN — DOCUSATE SODIUM AND SENNOSIDES 1 TABLET: 8.6; 5 TABLET, FILM COATED ORAL at 11:22

## 2017-10-19 RX ADMIN — Medication 10 ML: at 06:32

## 2017-10-19 RX ADMIN — ARFORMOTEROL TARTRATE 15 MCG: 15 SOLUTION RESPIRATORY (INHALATION) at 19:56

## 2017-10-19 RX ADMIN — INSULIN LISPRO 2 UNITS: 100 INJECTION, SOLUTION INTRAVENOUS; SUBCUTANEOUS at 11:22

## 2017-10-19 RX ADMIN — POLYETHYLENE GLYCOL 3350 17 G: 17 POWDER, FOR SOLUTION ORAL at 11:22

## 2017-10-19 RX ADMIN — ASPIRIN 81 MG 81 MG: 81 TABLET ORAL at 08:00

## 2017-10-19 RX ADMIN — METHYLPREDNISOLONE 4 MG: 4 TABLET ORAL at 17:08

## 2017-10-19 RX ADMIN — ARFORMOTEROL TARTRATE 15 MCG: 15 SOLUTION RESPIRATORY (INHALATION) at 07:30

## 2017-10-19 RX ADMIN — METHYLPREDNISOLONE 8 MG: 4 TABLET ORAL at 22:00

## 2017-10-19 RX ADMIN — SITAGLIPTIN 25 MG: 25 TABLET, FILM COATED ORAL at 07:55

## 2017-10-19 RX ADMIN — HEPARIN SODIUM 5000 UNITS: 5000 INJECTION, SOLUTION INTRAVENOUS; SUBCUTANEOUS at 15:09

## 2017-10-19 RX ADMIN — FENOFIBRATE 145 MG: 145 TABLET ORAL at 17:08

## 2017-10-19 RX ADMIN — BUDESONIDE 500 MCG: 0.5 INHALANT RESPIRATORY (INHALATION) at 19:56

## 2017-10-19 RX ADMIN — VILAZODONE HYDROCHLORIDE 20 MG: 20 TABLET ORAL at 17:08

## 2017-10-19 RX ADMIN — ATORVASTATIN CALCIUM 10 MG: 10 TABLET, FILM COATED ORAL at 08:00

## 2017-10-19 RX ADMIN — BUDESONIDE 500 MCG: 0.5 INHALANT RESPIRATORY (INHALATION) at 07:30

## 2017-10-19 RX ADMIN — METHYLPREDNISOLONE 4 MG: 4 TABLET ORAL at 07:55

## 2017-10-19 RX ADMIN — Medication 10 ML: at 15:09

## 2017-10-19 RX ADMIN — HEPARIN SODIUM 5000 UNITS: 5000 INJECTION, SOLUTION INTRAVENOUS; SUBCUTANEOUS at 22:00

## 2017-10-19 RX ADMIN — HEPARIN SODIUM 5000 UNITS: 5000 INJECTION, SOLUTION INTRAVENOUS; SUBCUTANEOUS at 06:32

## 2017-10-19 RX ADMIN — MONTELUKAST SODIUM 10 MG: 10 TABLET, FILM COATED ORAL at 17:08

## 2017-10-19 RX ADMIN — LORAZEPAM 2 MG: 2 INJECTION INTRAMUSCULAR; INTRAVENOUS at 17:31

## 2017-10-19 RX ADMIN — METHYLPREDNISOLONE 4 MG: 4 TABLET ORAL at 11:22

## 2017-10-19 NOTE — PROGRESS NOTES
1451:  Family requested pt have an MRI under sedation and that pt was willing to do it that way. Otherwise, they requested pt be transferred to [address] [place] [address]. Dr. Anat Moore was notified and is willing to do the MRI under mild sedation. Family notified. [address] [place] [address] rehab denied pt saying: \"No, unable to accept patient Left Leg Weakness is not a CMS diagnosis to be used for admission to our unit, and chronic back pain also will not work. I saw where the patient refused an MRI, so we don't have definitive diagnosis that can be used for inpatient rehab. \"   [facility] evaluated pt. No definitive dx for admission. ERI Joseph     1416:  [facility] does not have any beds available today. Pt was evaluated by [facility]. Still waiting for eval from [facility]. No response from [address] [place] [address]. I called [facility] at [phone] and left a message for her to call me. ERI Joseph    CM Note:  Met with pt and family for choice for IP rehab. Their first choice is [facility]. They were agreeable to having referrals sent to multiple rehab hospitals. Referrals were sent in [facility] to [facility], [facility] and [facility] and [address] [place] [address].   ERI Joseph

## 2017-10-19 NOTE — PROGRESS NOTES
Pt is scheduled for an MRI this pm. CM reports that pt is unable to be discharged today. Called, notified Dr. Larissa Garcia. New orders received to discontinue pt's discharge.

## 2017-10-19 NOTE — PROGRESS NOTES
Marquis Garcia [facility]  [address] [facility], [address] [place] [address]  [phone]      Medical Progress Note      NAME: Julia Higginbotham   :  8/10/1927  MRM:  [id number]    Date/Time: 10/19/2017  11:50 AM       Assessment and Plan:     Lumbar osteoarthritis / Lumbar stenosis / LT leg weakness / lower back pain - POA, now much improved. Stenosis is presumed, but he refused MRI of the L spine, for which he was admitted. Ortho agrees, he can have an open MRI as outpatient, and can be treated now with a medrol dose pack. Pain is better controlled, but he feels he cannot return home. Consulted orthopedics, PT/OT. Fall precaution. DC to rehab. Family refused to allow paperwork to get started, and this delayed discharge. Continue ASA.       DM (diabetes mellitus) with renal complications - BG up a bit with steroids. Diabetic diet and counseling. SSI per protocol. Continue home sitagliptan. I had hald glimepiride due to low BG.      CKD (chronic kidney disease) stage 3 - Unknown baseline creatinine, but now at stage 4. He is followed by nephrology as outpatient. US without obstruction. Renal mass, which requires outpatient follow up     COPD (chronic obstructive pulmonary disease) - Stable. Not wheezing. Continue home pulmicort, singulair, brovana and prn nebs.      Depression and anxiety - This prevented MRI, and likely contributes to his sensation of pain. Continue vilazodone and ambien. Hyperlipidemia - Continue atorvastatin and tricor. Subjective:     Chief Complaint:  Back pain is better, would like to go to rehab. ROS:  (bold if positive, if negative)      Tolerating some PT  Tolerating Diet        Objective:     Last 24hrs VS reviewed since prior progress note.  Most recent are:    Visit Vitals    /75 (BP 1 Location: Right arm, BP Patient Position: At rest)    Pulse 84    Temp 97.3 °F (36.3 °C)    Resp 17    SpO2 99%     SpO2 Readings from Last 6 Encounters: 10/19/17 99%    O2 Flow Rate (L/min): 2 l/min       Intake/Output Summary (Last 24 hours) at 10/19/17 0717  Last data filed at 10/19/17 0433   Gross per 24 hour   Intake              480 ml   Output             1450 ml   Net             -970 ml        Physical Exam:    Gen:  Well-developed, well-nourished, in no acute distress  HEENT:  Pink conjunctivae, PERRL, hearing intact to voice, moist mucous membranes  Neck:  Supple, without masses, thyroid non-tender  Resp:  No accessory muscle use, clear breath sounds without wheezes rales or rhonchi  Card:  No murmurs, normal S1, S2 without thrills, bruits or peripheral edema  Abd:  Soft, non-tender, non-distended, normoactive bowel sounds are present, no mass  Lymph:  No cervical or inguinal adenopathy  Musc:  No cyanosis or clubbing, ROM limited by pain  Skin:  No rashes or ulcers, skin turgor is good  Neuro:  Cranial nerves are grossly intact, pain and LE motor weakness, follows commands vaguely  Psych:   Moderate insight, oriented to person, place and time, alert    Telemetry reviewed:   normal sinus rhythm  __________________________________________________________________  Medications Reviewed: (see below)  Medications:     Current Facility-Administered Medications   Medication Dose Route Frequency    methylPREDNISolone (MEDROL) tablet 4 mg  4 mg Oral TID WITH MEALS    Followed by   Marion methylPREDNISolone (MEDROL) tablet 8 mg  8 mg Oral ONCE    Followed by   Ferny Stoddard ON 10/20/2017] methylPREDNISolone (MEDROL) tablet 4 mg  4 mg Oral QID WITH MEALS    Followed by   Ferny Stoddard ON 10/21/2017] methylPREDNISolone (MEDROL) tablet 4 mg  4 mg Oral TID WITH MEALS    Followed by   Ferny Stoddard ON 10/22/2017] methylPREDNISolone (MEDROL) tablet 4 mg  4 mg Oral ACB/HS    Followed by   Ferny Stoddard ON 10/23/2017] methylPREDNISolone (MEDROL) tablet 4 mg  4 mg Oral ACB    sodium chloride (NS) flush 5-10 mL  5-10 mL IntraVENous Q8H    sodium chloride (NS) flush 5-10 mL  5-10 mL IntraVENous PRN    dextrose (D50W) injection syrg 12.5-25 g  12.5-25 g IntraVENous PRN    aspirin chewable tablet 81 mg  81 mg Oral DAILY    atorvastatin (LIPITOR) tablet 10 mg  10 mg Oral DAILY    budesonide (PULMICORT) 500 mcg/2 ml nebulizer suspension  500 mcg Nebulization BID RT    fenofibrate nanocrystallized (TRICOR) tablet 145 mg  145 mg Oral QPM    montelukast (SINGULAIR) tablet 10 mg  10 mg Oral QPM    SITagliptin (JANUVIA) tablet tab 25 mg  25 mg Oral DAILY WITH BREAKFAST    zolpidem (AMBIEN) tablet 2.5 mg  2.5 mg Oral QHS    vilazodone (VIIBRYD) tablet 20 mg  20 mg Oral QPM    sodium chloride (NS) flush 5-10 mL  5-10 mL IntraVENous Q8H    sodium chloride (NS) flush 5-10 mL  5-10 mL IntraVENous PRN    heparin (porcine) injection 5,000 Units  5,000 Units SubCUTAneous Q8H    insulin lispro (HUMALOG) injection   SubCUTAneous AC&HS    glucose chewable tablet 16 g  4 Tab Oral PRN    dextrose (D50W) injection syrg 12.5-25 g  12.5-25 g IntraVENous PRN    glucagon (GLUCAGEN) injection 1 mg  1 mg IntraMUSCular PRN    arformoterol (BROVANA) neb solution 15 mcg  15 mcg Nebulization BID RT    influenza vaccine 2017-18 (3 yrs+)(PF) (FLUZONE QUAD/FLUARIX QUAD) injection 0.5 mL  0.5 mL IntraMUSCular PRIOR TO DISCHARGE        Lab Data Reviewed: (see below)  Lab Review:     Recent Labs      10/16/17   1206   WBC  7.3   HGB  12.9   HCT  40.4   PLT  273     Recent Labs      10/16/17   1206   NA  143   K  4.5   CL  107   CO2  29   GLU  160*   BUN  39*   CREA  2.22*   CA  9.5   ALB  3.3*   TBILI  0.4   SGOT  14*   ALT  17   INR  1.2*     Lab Results   Component Value Date/Time    Glucose (POC) 233 10/18/2017 09:12 PM    Glucose (POC) 110 10/18/2017 04:01 PM    Glucose (POC) 200 10/18/2017 10:56 AM    Glucose (POC) 100 10/18/2017 07:25 AM    Glucose (POC) 98 10/17/2017 10:14 PM     No results for input(s): PH, PCO2, PO2, HCO3, FIO2 in the last 72 hours.   Recent Labs      10/16/17   1206   INR  1.2*     All Micro Results None          I have reviewed notes of prior 24hr.     Other pertinent lab: none    Total time spent with patient: [address] discussed with: Patient, Care Manager, Nursing Staff, Consultant/Specialist and >50% of time spent in counseling and coordination of care    Discussed:  Care Plan and D/C Planning    Prophylaxis:  H2B/PPI    Disposition:   PT, OT, RN           ___________________________________________________    Attending Physician: Jessica Freire MD

## 2017-10-19 NOTE — DISCHARGE SUMMARY
Physician Discharge Summary     Patient ID:  Lupe Tejeda  [id number]  6 y.o.  8/10/1927    Admit date: 10/16/2017    Discharge date and time: 10/20/2017    Admission Diagnoses: Weakness  Weakness    Discharge Diagnoses:    Principal Diagnosis     Lumbar osteoarthritis                                           Other Diagnoses    Lumbar osteoarthritis /     Lumbar foraminal stenosis    Weakness (10/16/2017)    CKD (chronic kidney disease) stage 3, GFR 30-59 ml/min (10/16/2017)    DM (diabetes mellitus) ([facility] (10/16/2017)    COPD (chronic obstructive pulmonary disease) ([facility] (10/16/2017)    Back pain (10/16/2017)    Hospital Course:   Lumbar osteoarthritis / Lumbar stenosis / LT leg weakness / lower back pain - POA, now much improved. Lumber OA and foraminal stenosis noted on MRI, as expected. He was initially admitted to have an MRI, and then he refused MRI due to anxiety, then he agreed to discharge with a plan for outpatient MRI, then he refused discharge and demanded MRI again. These are the issues that kept him as an observation patient for 5 days.  Ortho consulted, and he has improved on a medrol dose pack. Pain is better controlled, but he still feels he cannot return home. Consulted PT/OT. Fall precaution. DC to rehab. Family refused to allow paperwork to get started 2 days ago, and this also delayed discharge. Continue ASA.        DM (diabetes mellitus) with renal complications - BG up a bit with steroids. Diabetic diet and counseling. SSI per protocol. Continue home sitagliptan. I had hald glimepiride due to low BG on presentation, and likely they will be low again after steroids stopped.       CKD (chronic kidney disease) stage 3 - Unknown baseline creatinine, but now at stage 4. He is followed by nephrology as outpatient. US without obstruction.   Renal mass, which requires outpatient follow up, appreciate urology discussing that with patient.      COPD (chronic obstructive pulmonary disease) - Stable. Not wheezing. Continue home pulmicort, singulair, brovana and prn nebs.       Depression and anxiety - Anxiety prevented MRI, and likely contributes to his sensation of pain. Continue vilazodone and ambien.     Hyperlipidemia - Continue atorvastatin and tricor. PCP: Kylie Conner MD    Consults: Orthopedic Surgery and Urology    Significant Diagnostic Studies: See Hospital Course    Discharged to rehab in improved condition. Discharge Exam:   /70 (BP 1 Location: Right arm, BP Patient Position: At rest)    Pulse 84    Temp 97.6 °F (36.4 °C)    Resp 16    SpO2 96%      Gen:  Well-developed, well-nourished, in no acute distress  HEENT:  Pink conjunctivae, PERRL, hearing intact to voice, moist mucous membranes  Neck:  Supple, without masses, thyroid non-tender  Resp:  No accessory muscle use, clear breath sounds without wheezes rales or rhonchi  Card:  No murmurs, normal S1, S2 without thrills, bruits or peripheral edema  Abd:  Soft, non-tender, non-distended, normoactive bowel sounds are present, no mass  Lymph:  No cervical or inguinal adenopathy  Musc:  No cyanosis or clubbing, ROM limited by pain  Skin:  No rashes or ulcers, skin turgor is good  Neuro:  Cranial nerves are grossly intact, pain and LE motor weakness, follows commands vaguely  Psych: Moderate insight, oriented to person, place and time, alert    Patient Instructions:   Current Discharge Medication List      START taking these medications    Details   methylPREDNISolone (MEDROL) 4 mg tab Take 1 tab 3 times a day for 1 day, then 2 times a day for 1 days, then 1 per day. Qty: 6 Tab, Refills: 0         CONTINUE these medications which have NOT CHANGED    Details   aspirin 81 mg chewable tablet Take 81 mg by mouth daily. SITagliptin (JANUVIA) 100 mg tablet Take 100 mg by mouth daily. atorvastatin (LIPITOR) 10 mg tablet Take 10 mg by mouth daily.       montelukast (SINGULAIR) 10 mg tablet Take 10 mg by mouth every evening. glimepiride (AMARYL) 2 mg tablet Take 2 mg by mouth every evening. vilazodone (VIIBRYD) 10 mg tab tablet Take 20 mg by mouth every evening. zolpidem (AMBIEN) 5 mg tablet Take 2.5 mg by mouth nightly. budesonide (PULMICORT) 0.5 mg/2 mL nbsp 500 mcg by Nebulization route two (2) times a day. Use with arformoterol (Brovana) nebs      arformoterol (BROVANA) 15 mcg/2 mL nebu neb solution 15 mcg by Nebulization route two (2) times a day. Use with budesonide (Pulmicort) nebs      fenofibrate nanocrystallized (TRICOR) 145 mg tablet Take 145 mg by mouth every evening. Vit A,C,E-Zinc-Copper (PRESERVISION AREDS) cap capsule Take 1 Cap by mouth two (2) times a day. Activity: Activity as tolerated  Diet: Cardiac Diet, Diabetic Diet and Low fat, Low cholesterol  Wound Care: None needed    Follow-up with your PCP and ortho in a few weeks.   Follow-up tests/labs - none    Signed:  Stu Gould MD  10/20/2017  8:49 AM

## 2017-10-19 NOTE — ROUTINE PROCESS
Bedside and Verbal shift change report given to Zohaib RN (oncoming nurse) by Tammy Melton RN (offgoing nurse). Report included the following information SBAR, Kardex, Procedure Summary, Intake/Output, MAR, Accordion and Recent Results.

## 2017-10-19 NOTE — CONSULTS
Urology Consult    Subjective:     Date of Consultation:  October 19, 2017    Referring Physician: Jocelyn Wilhelm    Reason for Consultation:  L renal mass    History of Present Illness:   Patient is a 80 y.o.  male who is being seen for L renal mass. He was admitted to the hospital for weakness. He has grade 3 and possibly grade 4 renal insufficiency. I reviewed this with the patient and his daughters. He is seen by Dr. Milagro Hammer generally for this. He has had some imaging and he was thought to have a stone in one of his kidneys. In his evaluation here, a renal ultrasound showed a possible left lower pole renal mass measuring 3 cm in size. He has no true flank pain but is generally dealing with some lower back pain. Plan at this admission was to get an MRI, but he did not tolerate this in a confined space. He denies any problems with urination. He has a good flow without straining and is not bothered. Past Medical History:   Diagnosis Date    Chronic obstructive pulmonary disease ([address]     DM type 2 causing renal disease ([address]     Hyperlipidemia       Past Surgical History:   Procedure Laterality Date    HX CHOLECYSTECTOMY      HX HEENT      bilateral cataracts    HX ORTHOPAEDIC        Family History   Problem Relation Age of Onset    Diabetes Mother       Social History   Substance Use Topics    Smoking status: Former Smoker     Packs/day: 1.00     Years: 10.00     Quit date: 1967    Smokeless tobacco: Never Used    Alcohol use No     No Known Allergies   Prior to Admission medications    Medication Sig Start Date End Date Taking? Authorizing Provider   methylPREDNISolone (MEDROL) 4 mg tab Take 1 tab 3 times a day for 1 day, then 2 times a day for 1 days, then 1 per day. 10/19/17  Yes Sravanthi Horta MD   aspirin 81 mg chewable tablet Take 81 mg by mouth daily. Yes Marie Ibarra MD   SITagliptin (JANUVIA) 100 mg tablet Take 100 mg by mouth daily.    Yes Marie Ibarra MD   atorvastatin (LIPITOR) 10 mg tablet Take 10 mg by mouth daily. Yes Marie Ibarra MD   montelukast (SINGULAIR) 10 mg tablet Take 10 mg by mouth every evening. Yes Marie Ibarra MD   glimepiride (AMARYL) 2 mg tablet Take 2 mg by mouth every evening. Yes Marie Ibarra MD   vilazodone (VIIBRYD) 10 mg tab tablet Take 20 mg by mouth every evening. Yes Marie Ibarra MD   zolpidem (AMBIEN) 5 mg tablet Take 2.5 mg by mouth nightly. Yes Marie Ibarra MD   budesonide (PULMICORT) 0.5 mg/2 mL nbsp 500 mcg by Nebulization route two (2) times a day. Use with arformoterol (Brovana) nebs   Yes Marie Ibarra MD   arformoterol (BROVANA) 15 mcg/2 mL nebu neb solution 15 mcg by Nebulization route two (2) times a day. Use with budesonide (Pulmicort) nebs   Yes Marie Ibarra MD   fenofibrate nanocrystallized (TRICOR) 145 mg tablet Take 145 mg by mouth every evening. Yes Historical Provider   Vit A,C,E-Zinc-Copper (PRESERVISION AREDS) cap capsule Take 1 Cap by mouth two (2) times a day. Yes Historical Provider         Review of Systems:  Pertinent items are noted in HPI.     Objective:     Patient Vitals for the past 8 hrs:   BP Temp Pulse Resp SpO2   10/19/17 1056 133/73 97.4 °F (36.3 °C) 92 16 98 %   10/19/17 0730 - - - - 99 %   10/19/17 0719 145/75 97.3 °F (36.3 °C) 84 17 98 %     Temp (24hrs), Av.8 °F (36.6 °C), Min:97.3 °F (36.3 °C), Max:98.2 °F (36.8 °C)      Intake and Output:   10/17 1901 - 10/19 0700  In: 480 [P.O.:480]  Out: 8346 [Urine:2615]    Physical Exam:  EXAMINATION:    Appearance:  well-developed NAD    Conjunctiva/Lids: conjunctivae and lids normal   External Ears/Nose:   normal no lesions or deformities   Neck:  supple   Respiratory Effort:  breathing easily   Lower Extremity: Mild LE edema   Abdomen/Flank: soft non-tender without masses; no CVA tenderness   Liver/Spleen: no organomegaly     Hernia: no ventral hernia    Gait/Station: Pt in bed at this time   Skin Inspection:  warm and dry   Mood/Affect: normal Assessment:     Active Problems:    Weakness (10/16/2017)      CKD (chronic kidney disease) stage 3, GFR 30-59 ml/min (10/16/2017)      DM (diabetes mellitus) ([facility] (10/16/2017)      COPD (chronic obstructive pulmonary disease) ([facility] (10/16/2017)      Back pain (10/16/2017)    L renal mass    Plan:     I discussed the findings with the patient but primarily with his daughters who are his power of  and with whom he lives. I reviewed the ultrasound. There is a projection of the left lower pole which may represent a mass, and may be cystic. Further clarification with CT imaging is warranted. This can be done as an outpatient. If he is going to remain an inpatient, this can be scheduled at any time as well. Though ideally, this would be done  with without IV contrast, with his renal insufficiency, I would recommend doing this without contrast initially and see if we can clarify this well enough to avoid this. He also has had imaging at [facility]. It appears he had a renal ultrasound in 11/14  showing no obvious mass by report and a 1.5 cm left lower pole stone. Imaging will help clarify this as well.     Signed By: Tee Sofia MD                         October 19, 2017

## 2017-10-19 NOTE — ACP (ADVANCE CARE PLANNING)
Paged to assist patient and family in completion of Advance Medical Directive. Form gone over with patient and family and completed. Copy and original given to patient; copy placed in patient's medical chart for scanning. Pt named his daughter Ellen Lemus as his medical Power of ; and daughter Audra Zarco as successor agent. Patient is [employer] and vitor is important to him. Pastoral care is available to follow as needed. [phone]. Visit by: Ligia Bailey. Juan Richard.  Haley Duran MA, [facility]    Lead  Profession Development & Advancement

## 2017-10-19 NOTE — PROGRESS NOTES
Spoke with Dr. Gera Henderson about the questions that family had around the MRI still being done. The family was concerned that he would transferred to rehab without the MRI being done. Dr. Gera Henderson stated that he would come back and talk with the family as he was in a family meeting. Will communicate with family.

## 2017-10-19 NOTE — PROGRESS NOTES
1100: Patient without BM for three days. Notified Dr. Alessandra Leon. Orders to follow. Family also requesting to speak to someone about a living will.  paged. Will be up to see patient shortly. 1730: Notified by MRI will be up for patient. Ativan given. Bedside shift change report given to [id number] (oncoming nurse) by Amaury Pichardo RN (offgoing nurse). Report included the following information SBAR, Kardex, Intake/Output, MAR and Recent Results.

## 2017-10-19 NOTE — DISCHARGE INSTRUCTIONS
Patient Discharge Instructions    Ana [facility] / [id number] : 8/10/1927    Admitted 10/16/2017 Discharged: 10/19/2017     Primary Diagnoses  Problem List as of 10/19/2017  Date Reviewed: 10/16/2017          Codes Class Noted - Resolved   Weakness   CKD (chronic kidney disease) stage 3, GFR 30-59 ml/min   DM (diabetes mellitus) ([facility]   COPD (chronic obstructive pulmonary disease) ([facility]   Back pain          Take Home Medications     · It is important that you take the medication exactly as they are prescribed. · Keep your medication in the bottles provided by the pharmacist and keep a list of the medication names, dosages, and times to be taken in your wallet. · Do not take other medications without consulting your doctor. What to do at Home    Recommended diet: Cardiac Diet, Diabetic Diet and Low fat, Low cholesterol    Recommended activity: Activity as tolerated    If you experience worse pain, please follow up with Orthopedic surgery. Follow-up with your PCP in a few weeks        Information obtained by :  I understand that if any problems occur once I am at home I am to contact my physician. I understand and acknowledge receipt of the instructions indicated above.                                                                                                                                            Physician's or R.N.'s Signature                                                                  Date/Time                                                                                                                                              Patient or Representative Signature                                                          Date/Time

## 2017-10-19 NOTE — PROGRESS NOTES
Problem: Falls - Risk of  Goal: *Absence of Falls  Document Breonna Fall Risk and appropriate interventions in the flowsheet.    Outcome: Progressing Towards Goal  Fall Risk Interventions:  Mobility Interventions: Communicate number of staff needed for ambulation/transfer, OT consult for ADLs, Patient to call before getting OOB, PT Consult for mobility concerns, PT Consult for assist device competence, Utilize walker, cane, or other assitive device         Medication Interventions: Patient to call before getting OOB, Teach patient to arise slowly    Elimination Interventions: Call light in reach, Patient to call for help with toileting needs, Toileting schedule/hourly rounds, Urinal in reach

## 2017-10-19 NOTE — PROGRESS NOTES
Spiritual Care Assessment/Progress Notes    Lowell Lucia [id number]  [id number]    8/10/1927  80 y.o.  male    Patient Telephone Number: There is no home phone number on file. Jew Affiliation: Jewish   Language: English   Extended Emergency Contact Information  Primary Emergency Contact: [address]  Mobile Phone: [phone]  Relation: Daughter   Patient Active Problem List    Diagnosis Date Noted    Weakness 10/16/2017    CKD (chronic kidney disease) stage 3, GFR 30-59 ml/min 10/16/2017    DM (diabetes mellitus) ([facility] [address] [facility] 10/16/2017    [facility] 10/16/2017    Back pain 10/16/2017        Date: 10/19/2017       Level of Jew/Spiritual Activity:  []         Involved in vitor tradition/spiritual practice    []         Not involved in vitor tradition/spiritual practice  [x]         Spiritually oriented    []         Claims no spiritual orientation    []         seeking spiritual identity  []         Feels alienated from Druze practice/tradition  []         Feels angry about Druze practice/tradition  [x]         Spirituality/Druze tradition is a resource for coping at this time.   []         Not able to assess due to medical condition    Services Provided Today:  []         crisis intervention    []         reading Scriptures  [x]         spiritual assessment    []         prayer  []         empathic listening/emotional support  []         rites and rituals (cite in comments)  []         life review     []         Druze support  []         theological development   []         advocacy  []         ethical dialog     []         blessing  []         bereavement support    []         support to family  []         anticipatory grief support   [x]         help with AMD  []         spiritual guidance    []         meditation      Spiritual Care Needs  [x]         Emotional Support  [x]         Spiritual/Jew Care  [] Loss/Adjustment  []         Advocacy/Referral                /Ethics  []         No needs expressed at               this time  []         Other: (note in               comments)  Spiritual Care Plan  []         Follow up visits with               pt/family  []         Provide materials  []         Schedule sacraments  []         Contact Community               Clergy  [x]         Follow up as needed  []         Other: (note in               comments)     Comments:  Paged to assist patient and family in completion of Advance Medical Directive. Form gone over with patient and family and completed. Copy and original given to patient; copy placed in patient's medical chart for scanning. Pt named his daughter Ag Tanner as his medical Power of ; and daughter Radha Rm as successor agent. Patient is Sabianism and vitor is important to him. Pastoral care is available to follow as needed. [phone]. Visit by: Genesis Fernando. Debby Duran D.Min, MA, [facility]    Lead  Profession Development & Advancement

## 2017-10-19 NOTE — ROUTINE PROCESS
Bedside and Verbal shift change report given to Ruthie Houser RN (oncoming nurse) by Paula Ospina RN (offgoing nurse). Report included the following information SBAR, Kardex, Procedure Summary, Intake/Output, MAR, Accordion, Recent Results and Med Rec Status.

## 2017-10-20 ENCOUNTER — HOSPITAL ENCOUNTER (OUTPATIENT)
Age: 82
Discharge: HOME | End: 2017-11-03
Attending: PHYSICAL MEDICINE & REHABILITATION | Admitting: PHYSICAL MEDICINE & REHABILITATION

## 2017-10-20 VITALS — HEIGHT: 69 IN | BODY MASS INDEX: 31.01 KG/M2 | WEIGHT: 209.38 LBS

## 2017-10-20 VITALS
HEART RATE: 79 BPM | RESPIRATION RATE: 18 BRPM | DIASTOLIC BLOOD PRESSURE: 68 MMHG | SYSTOLIC BLOOD PRESSURE: 135 MMHG | OXYGEN SATURATION: 97 % | TEMPERATURE: 97.8 F

## 2017-10-20 LAB
APPEARANCE UR: CLEAR
BACTERIA URNS QL MICRO: NEGATIVE /HPF
BILIRUB UR QL: NEGATIVE
COLOR UR: NORMAL
EPITH CASTS URNS QL MICRO: NORMAL /LPF
GLUCOSE BLD STRIP.AUTO-MCNC: 100 MG/DL (ref 65–100)
GLUCOSE BLD STRIP.AUTO-MCNC: 114 MG/DL (ref 65–100)
GLUCOSE BLD STRIP.AUTO-MCNC: 97 MG/DL (ref 65–100)
GLUCOSE UR STRIP.AUTO-MCNC: NEGATIVE MG/DL
HGB UR QL STRIP: NEGATIVE
HYALINE CASTS URNS QL MICRO: NORMAL /LPF (ref 0–5)
KETONES UR QL STRIP.AUTO: NEGATIVE MG/DL
LEUKOCYTE ESTERASE UR QL STRIP.AUTO: NEGATIVE
NITRITE UR QL STRIP.AUTO: NEGATIVE
PH UR STRIP: 5.5 [PH] (ref 5–8)
PROT UR STRIP-MCNC: NEGATIVE MG/DL
RBC #/AREA URNS HPF: NORMAL /HPF (ref 0–5)
SERVICE CMNT-IMP: ABNORMAL
SERVICE CMNT-IMP: NORMAL
SERVICE CMNT-IMP: NORMAL
SP GR UR REFRACTOMETRY: 1.01 (ref 1–1.03)
UROBILINOGEN UR QL STRIP.AUTO: 1 EU/DL (ref 0.2–1)
WBC URNS QL MICRO: NORMAL /HPF (ref 0–4)

## 2017-10-20 PROCEDURE — 74011250637 HC RX REV CODE- 250/637: Performed by: PHYSICAL MEDICINE & REHABILITATION

## 2017-10-20 PROCEDURE — 97110 THERAPEUTIC EXERCISES: CPT

## 2017-10-20 PROCEDURE — 90471 IMMUNIZATION ADMIN: CPT

## 2017-10-20 PROCEDURE — 97530 THERAPEUTIC ACTIVITIES: CPT

## 2017-10-20 PROCEDURE — 97535 SELF CARE MNGMENT TRAINING: CPT

## 2017-10-20 PROCEDURE — 94640 AIRWAY INHALATION TREATMENT: CPT

## 2017-10-20 PROCEDURE — 74011250637 HC RX REV CODE- 250/637: Performed by: INTERNAL MEDICINE

## 2017-10-20 PROCEDURE — 74011636637 HC RX REV CODE- 636/637: Performed by: PHYSICAL MEDICINE & REHABILITATION

## 2017-10-20 PROCEDURE — 87086 URINE CULTURE/COLONY COUNT: CPT | Performed by: PHYSICAL MEDICINE & REHABILITATION

## 2017-10-20 PROCEDURE — 74011250636 HC RX REV CODE- 250/636: Performed by: PHYSICAL MEDICINE & REHABILITATION

## 2017-10-20 PROCEDURE — 74011250636 HC RX REV CODE- 250/636: Performed by: INTERNAL MEDICINE

## 2017-10-20 PROCEDURE — 99218 HC RM OBSERVATION: CPT

## 2017-10-20 PROCEDURE — 74011000250 HC RX REV CODE- 250: Performed by: INTERNAL MEDICINE

## 2017-10-20 PROCEDURE — 90686 IIV4 VACC NO PRSV 0.5 ML IM: CPT | Performed by: INTERNAL MEDICINE

## 2017-10-20 PROCEDURE — 74011250636 HC RX REV CODE- 250/636: Performed by: PHYSICIAN ASSISTANT

## 2017-10-20 PROCEDURE — 97116 GAIT TRAINING THERAPY: CPT

## 2017-10-20 PROCEDURE — 96372 THER/PROPH/DIAG INJ SC/IM: CPT

## 2017-10-20 PROCEDURE — 77010033678 HC OXYGEN DAILY

## 2017-10-20 PROCEDURE — 74011000250 HC RX REV CODE- 250: Performed by: PHYSICAL MEDICINE & REHABILITATION

## 2017-10-20 PROCEDURE — 81001 URINALYSIS AUTO W/SCOPE: CPT | Performed by: PHYSICAL MEDICINE & REHABILITATION

## 2017-10-20 PROCEDURE — 82962 GLUCOSE BLOOD TEST: CPT

## 2017-10-20 RX ORDER — ADHESIVE BANDAGE
30 BANDAGE TOPICAL DAILY PRN
Status: DISCONTINUED | OUTPATIENT
Start: 2017-10-20 | End: 2017-11-03 | Stop reason: HOSPADM

## 2017-10-20 RX ORDER — MONTELUKAST SODIUM 10 MG/1
10 TABLET ORAL EVERY EVENING
Status: DISCONTINUED | OUTPATIENT
Start: 2017-10-20 | End: 2017-11-03 | Stop reason: HOSPADM

## 2017-10-20 RX ORDER — HEPARIN SODIUM 5000 [USP'U]/ML
5000 INJECTION, SOLUTION INTRAVENOUS; SUBCUTANEOUS EVERY 8 HOURS
Status: DISCONTINUED | OUTPATIENT
Start: 2017-10-20 | End: 2017-11-03 | Stop reason: HOSPADM

## 2017-10-20 RX ORDER — AMOXICILLIN 250 MG
1 CAPSULE ORAL 2 TIMES DAILY
Status: DISCONTINUED | OUTPATIENT
Start: 2017-10-20 | End: 2017-11-03 | Stop reason: HOSPADM

## 2017-10-20 RX ORDER — AMOXICILLIN 250 MG
1 CAPSULE ORAL DAILY
Qty: 30 TAB | Refills: 0 | Status: SHIPPED | OUTPATIENT
Start: 2017-10-20 | End: 2017-11-19

## 2017-10-20 RX ORDER — ATORVASTATIN CALCIUM 10 MG/1
10 TABLET, FILM COATED ORAL DAILY
Status: DISCONTINUED | OUTPATIENT
Start: 2017-10-21 | End: 2017-11-03 | Stop reason: HOSPADM

## 2017-10-20 RX ORDER — INSULIN LISPRO 100 [IU]/ML
INJECTION, SOLUTION INTRAVENOUS; SUBCUTANEOUS
Status: DISCONTINUED | OUTPATIENT
Start: 2017-10-20 | End: 2017-10-26

## 2017-10-20 RX ORDER — GLIMEPIRIDE 2 MG/1
2 TABLET ORAL EVERY EVENING
Status: DISCONTINUED | OUTPATIENT
Start: 2017-10-20 | End: 2017-10-31

## 2017-10-20 RX ORDER — GUAIFENESIN 100 MG/5ML
81 LIQUID (ML) ORAL DAILY
Status: DISCONTINUED | OUTPATIENT
Start: 2017-10-21 | End: 2017-11-03 | Stop reason: HOSPADM

## 2017-10-20 RX ORDER — ACETAMINOPHEN 325 MG/1
650 TABLET ORAL
Status: DISCONTINUED | OUTPATIENT
Start: 2017-10-20 | End: 2017-11-03 | Stop reason: HOSPADM

## 2017-10-20 RX ORDER — POLYETHYLENE GLYCOL 3350 17 G/17G
17 POWDER, FOR SOLUTION ORAL DAILY
Status: DISCONTINUED | OUTPATIENT
Start: 2017-10-21 | End: 2017-11-03 | Stop reason: HOSPADM

## 2017-10-20 RX ORDER — NITROGLYCERIN 0.4 MG/1
0.4 TABLET SUBLINGUAL
Status: DISCONTINUED | OUTPATIENT
Start: 2017-10-20 | End: 2017-11-03 | Stop reason: HOSPADM

## 2017-10-20 RX ORDER — ARFORMOTEROL TARTRATE 15 UG/2ML
15 SOLUTION RESPIRATORY (INHALATION) 2 TIMES DAILY
Status: DISCONTINUED | OUTPATIENT
Start: 2017-10-20 | End: 2017-11-03 | Stop reason: HOSPADM

## 2017-10-20 RX ORDER — BUDESONIDE 0.5 MG/2ML
500 INHALANT ORAL 2 TIMES DAILY
Status: DISCONTINUED | OUTPATIENT
Start: 2017-10-20 | End: 2017-11-03 | Stop reason: HOSPADM

## 2017-10-20 RX ORDER — ZOLPIDEM TARTRATE 5 MG/1
2.5 TABLET ORAL
Status: DISCONTINUED | OUTPATIENT
Start: 2017-10-20 | End: 2017-11-03 | Stop reason: HOSPADM

## 2017-10-20 RX ORDER — METHYLPREDNISOLONE 4 MG/1
4 TABLET ORAL
Status: COMPLETED | OUTPATIENT
Start: 2017-10-23 | End: 2017-10-23

## 2017-10-20 RX ORDER — FACIAL-BODY WIPES
10 EACH TOPICAL DAILY PRN
Status: DISCONTINUED | OUTPATIENT
Start: 2017-10-20 | End: 2017-11-03 | Stop reason: HOSPADM

## 2017-10-20 RX ORDER — DEXTROSE 50 % IN WATER (D50W) INTRAVENOUS SYRINGE
25 AS NEEDED
Status: DISCONTINUED | OUTPATIENT
Start: 2017-10-20 | End: 2017-11-03 | Stop reason: HOSPADM

## 2017-10-20 RX ORDER — FENOFIBRATE 145 MG/1
145 TABLET, COATED ORAL EVERY EVENING
Status: DISCONTINUED | OUTPATIENT
Start: 2017-10-20 | End: 2017-11-03 | Stop reason: HOSPADM

## 2017-10-20 RX ORDER — METHYLPREDNISOLONE 4 MG/1
4 TABLET ORAL 2 TIMES DAILY
Status: COMPLETED | OUTPATIENT
Start: 2017-10-22 | End: 2017-10-22

## 2017-10-20 RX ORDER — ONDANSETRON 4 MG/1
4 TABLET, ORALLY DISINTEGRATING ORAL
Status: DISCONTINUED | OUTPATIENT
Start: 2017-10-20 | End: 2017-11-03 | Stop reason: HOSPADM

## 2017-10-20 RX ORDER — VILAZODONE HYDROCHLORIDE 20 MG/1
20 TABLET ORAL EVERY EVENING
Status: DISCONTINUED | OUTPATIENT
Start: 2017-10-20 | End: 2017-11-03 | Stop reason: HOSPADM

## 2017-10-20 RX ORDER — DOCUSATE SODIUM 100 MG/1
100 CAPSULE, LIQUID FILLED ORAL 2 TIMES DAILY
Status: DISCONTINUED | OUTPATIENT
Start: 2017-10-20 | End: 2017-11-03 | Stop reason: HOSPADM

## 2017-10-20 RX ORDER — METHYLPREDNISOLONE 4 MG/1
4 TABLET ORAL
Status: COMPLETED | OUTPATIENT
Start: 2017-10-21 | End: 2017-10-21

## 2017-10-20 RX ORDER — METHYLPREDNISOLONE 4 MG/1
4 TABLET ORAL
Status: COMPLETED | OUTPATIENT
Start: 2017-10-20 | End: 2017-10-20

## 2017-10-20 RX ORDER — MAGNESIUM SULFATE 100 %
16 CRYSTALS MISCELLANEOUS AS NEEDED
Status: DISCONTINUED | OUTPATIENT
Start: 2017-10-20 | End: 2017-11-03 | Stop reason: HOSPADM

## 2017-10-20 RX ADMIN — METHYLPREDNISOLONE 4 MG: 4 TABLET ORAL at 18:42

## 2017-10-20 RX ADMIN — METHYLPREDNISOLONE 4 MG: 4 TABLET ORAL at 12:46

## 2017-10-20 RX ADMIN — BUDESONIDE 500 MCG: 0.5 INHALANT RESPIRATORY (INHALATION) at 07:20

## 2017-10-20 RX ADMIN — VILAZODONE HYDROCHLORIDE 20 MG: 20 TABLET ORAL at 18:42

## 2017-10-20 RX ADMIN — ARFORMOTEROL TARTRATE 15 MCG: 15 SOLUTION RESPIRATORY (INHALATION) at 22:59

## 2017-10-20 RX ADMIN — METHYLPREDNISOLONE 4 MG: 4 TABLET ORAL at 08:52

## 2017-10-20 RX ADMIN — ZOLPIDEM TARTRATE 2.5 MG: 5 TABLET ORAL at 20:59

## 2017-10-20 RX ADMIN — ARFORMOTEROL TARTRATE 15 MCG: 15 SOLUTION RESPIRATORY (INHALATION) at 07:20

## 2017-10-20 RX ADMIN — DOCUSATE SODIUM AND SENNOSIDES 1 TABLET: 8.6; 5 TABLET, FILM COATED ORAL at 08:52

## 2017-10-20 RX ADMIN — INFLUENZA VIRUS VACCINE 0.5 ML: 15; 15; 15; 15 SUSPENSION INTRAMUSCULAR at 16:24

## 2017-10-20 RX ADMIN — HEPARIN SODIUM 5000 UNITS: 5000 INJECTION, SOLUTION INTRAVENOUS; SUBCUTANEOUS at 15:45

## 2017-10-20 RX ADMIN — INSULIN LISPRO 2 UNITS: 100 INJECTION, SOLUTION INTRAVENOUS; SUBCUTANEOUS at 23:04

## 2017-10-20 RX ADMIN — HEPARIN SODIUM 5000 UNITS: 5000 INJECTION, SOLUTION INTRAVENOUS; SUBCUTANEOUS at 06:00

## 2017-10-20 RX ADMIN — ASPIRIN 81 MG 81 MG: 81 TABLET ORAL at 08:52

## 2017-10-20 RX ADMIN — FENOFIBRATE 145 MG: 145 TABLET ORAL at 18:42

## 2017-10-20 RX ADMIN — SITAGLIPTIN 25 MG: 25 TABLET, FILM COATED ORAL at 08:52

## 2017-10-20 RX ADMIN — DOCUSATE SODIUM AND SENNOSIDES 1 TABLET: 8.6; 5 TABLET, FILM COATED ORAL at 20:59

## 2017-10-20 RX ADMIN — HEPARIN SODIUM 5000 UNITS: 5000 INJECTION, SOLUTION INTRAVENOUS; SUBCUTANEOUS at 20:58

## 2017-10-20 RX ADMIN — ATORVASTATIN CALCIUM 10 MG: 10 TABLET, FILM COATED ORAL at 08:52

## 2017-10-20 RX ADMIN — BUDESONIDE 500 MCG: 0.5 INHALANT RESPIRATORY (INHALATION) at 20:58

## 2017-10-20 RX ADMIN — DOCUSATE SODIUM 100 MG: 100 CAPSULE, LIQUID FILLED ORAL at 20:59

## 2017-10-20 RX ADMIN — Medication 10 ML: at 14:00

## 2017-10-20 RX ADMIN — MONTELUKAST SODIUM 10 MG: 10 TABLET, FILM COATED ORAL at 18:42

## 2017-10-20 RX ADMIN — GLIMEPIRIDE 2 MG: 2 TABLET ORAL at 18:42

## 2017-10-20 RX ADMIN — METHYLPREDNISOLONE 4 MG: 4 TABLET ORAL at 20:59

## 2017-10-20 NOTE — PROGRESS NOTES
Problem: Self Care Deficits Care Plan (Adult)  Goal: *Acute Goals and Plan of Care (Insert Text)  Occupational Therapy Goals  Initiated 10/17/2017  1. Patient will perform lower body dressing with supervision/set-up within 7 day(s). 2.  Patient will perform grooming, standing at sink, with supervision/set-up within 7 day(s). 3.  Patient will perform bathing with supervision/set-up within 7 day(s). 4.  Patient will perform toilet transfers with supervision/set-up within 7 day(s). 5.  Patient will perform all aspects of toileting with supervision/set-up within 7 day(s). 6.  Patient will participate in upper extremity therapeutic exercise/activities with supervision/set-up for 10 minutes within 7 day(s). Occupational Therapy TREATMENT  Patient: Mica Diaz (89 y.o. male)  Date: 10/20/2017  Diagnosis: Weakness  Weakness <principal problem not specified>       Precautions: Fall  Chart, occupational therapy assessment, plan of care, and goals were reviewed. ASSESSMENT:  Pt agreeable to OT. After transfer to chair he was able to bathe anterior upper body, min assist to wash his back. He doffed/donned hospital gown with contact guard. Pt stood to wash kaykay area/buttocks and min assist. Pt has posterior lean and verbalizes his L LE feels wobbly. Pt was also able to engage with 2 sets of UE therapeutic exercise for 9 minutes. One of pts daughter's present at end of session. Chair alarm activated. Recommend rehab. Progression toward goals:  [x]          Improving appropriately and progressing toward goals  []          Improving slowly and progressing toward goals  []          Not making progress toward goals and plan of care will be adjusted     PLAN:  Patient continues to benefit from skilled intervention to address the above impairments. Continue treatment per established plan of care.   Discharge Recommendations:  Rehab  Further Equipment Recommendations for Discharge: None     SUBJECTIVE:   Patient stated Why not? [employer] and as to trying more exercises. OBJECTIVE DATA SUMMARY:   Cognitive/Behavioral Status:  Neurologic State: Alert  Orientation Level: Oriented X4  Cognition: Appropriate decision making           Functional Mobility and Transfers for ADLs:   Bed Mobility:      Min assist supine to sit. Transfers: Moderate assist.    Balance: Impaired standing with support. ADL Intervention:            Upper Body Bathing  Bathing Assistance: Minimum assistance  Position Performed: Seated in chair  Cues: Physical assistance (to wash his back)    Lower Body Bathing  Perineal  : Minimum assistance  Position Performed: Standing    Upper Body Dressing Assistance  Dressing Assistance: Contact guard assistance  [facility] Gown: Contact guard assistance     Therapeutic Exercises:     EXERCISE   Sets   Reps   Active Active Assist   Passive   Comments   Finger flex/ext 2 10 [x]           []           []              Wrist flex/ext 2 10 [x]           []           []              Forearm supination/pronation 2 10 [x]           []           []              Elbow flex/ext 2 10 [x]           []           []              Chest presses 2 10 [x]           []           []              Shoulder flex/ext 2 10 [x]           []           []                 []           []           []                 []           []           []                 []           []           []                 []           []           []                 []           []           []                    Pain:  Pain Scale 1: Numeric (0 - 10)  Pain Intensity 1: 0                Activity Tolerance:    No signs/symptoms of distress or discomfort during OT  Please refer to the flowsheet for vital signs taken during this treatment.   After treatment:   [x]  Patient left in no apparent distress sitting up in chair  []  Patient left in no apparent distress in bed  [x]  Call bell left within reach  []  Nursing notified  [x]  Caregiver present  [x]  Chair alarm activated    COMMUNICATION/COLLABORATION:   The patients plan of care was discussed with: Physical Therapy Assistant and Occupational Therapist    INDU Zavala  Time Calculation: 30 mins

## 2017-10-20 NOTE — PROGRESS NOTES
I have reviewed discharge instructions with the patient. The patient and caregiver verbalized understanding.

## 2017-10-20 NOTE — PROGRESS NOTES
1537:  Patient to go to [facility] room 407B. Nursing to call report to [facility] at [phone]. SARITHA Enriquez [place], [address]:  Pt accepted by [facility] and can go today. [facility] still waiting on a room to come available. [facility] reviewing. [address] is unlikely to accept. I called Tana Cantu at [phone] and left a message for him to call me. Family stated they are good with pt going to [facility]. ERI Joseph     Note:  [facility] facilities requested to reconsider pt for rehab given results of MRI.   ERI Joseph

## 2017-10-20 NOTE — PROGRESS NOTES
Bedside shift change report given to YAZMIN HWANG (oncoming nurse) by Rohit Fortune RN (offgoing nurse).  Report included the following information SBAR, Kardex, Intake/Output, MAR and Accordion

## 2017-10-20 NOTE — PROGRESS NOTES
[facility]  [address] [facility], [address] [place] [address]  [phone]      Medical Progress Note      NAME: Loren Lawler   :  8/10/1927  MRM:  [id number]    Date/Time: 10/20/2017  8:44 AM       Assessment and Plan:     Lumbar osteoarthritis / Lumbar stenosis / LT leg weakness / lower back pain - POA, now much improved. Lumber OA and foraminal stenosis noted on MRI, as expected. He was initially admitted to have an MRI, and then he refused MRI due to anxiety, then he agreed to discharge with a plan for outpatient MRI, then he refused discharge and demanded MRI again. These are the issues that kept him as an observation patient for 5 days. Ortho consulted, and he has improved on a medrol dose pack. Pain is better controlled, but he still feels he cannot return home. Consulted PT/OT. Fall precaution. DC to rehab. Family refused to allow paperwork to get started 2 days ago, and this also delayed discharge. Continue ASA.       DM (diabetes mellitus) with renal complications - BG up a bit with steroids. Diabetic diet and counseling. SSI per protocol. Continue home sitagliptan. I had hald glimepiride due to low BG on presentation, and likely they will be low again after steroids stopped.      CKD (chronic kidney disease) stage 3 - Unknown baseline creatinine, but now at stage 4. He is followed by nephrology as outpatient. US without obstruction. Renal mass, which requires outpatient follow up, appreciate urology discussing that with patient.     COPD (chronic obstructive pulmonary disease) - Stable. Not wheezing. Continue home pulmicort, singulair, brovana and prn nebs.      Depression and anxiety - Anxiety prevented MRI, and likely contributes to his sensation of pain. Continue vilazodone and ambien. Hyperlipidemia - Continue atorvastatin and tricor. Subjective:     Chief Complaint:  Back pain is better, would like to go to rehab.     ROS:  (bold if positive, if negative)      Tolerating some PT  Tolerating Diet        Objective:     Last 24hrs VS reviewed since prior progress note. Most recent are:    Visit Vitals    /70 (BP 1 Location: Right arm, BP Patient Position: At rest)    Pulse 84    Temp 97.6 °F (36.4 °C)    Resp 16    SpO2 96%     SpO2 Readings from Last 6 Encounters:   10/20/17 96%    O2 Flow Rate (L/min): 2 l/min       Intake/Output Summary (Last 24 hours) at 10/20/17 0844  Last data filed at 10/19/17 1732   Gross per 24 hour   Intake                0 ml   Output             1400 ml   Net            -1400 ml        Physical Exam:    Gen:  Well-developed, well-nourished, in no acute distress  HEENT:  Pink conjunctivae, PERRL, hearing intact to voice, moist mucous membranes  Neck:  Supple, without masses, thyroid non-tender  Resp:  No accessory muscle use, clear breath sounds without wheezes rales or rhonchi  Card:  No murmurs, normal S1, S2 without thrills, bruits or peripheral edema  Abd:  Soft, non-tender, non-distended, normoactive bowel sounds are present, no mass  Lymph:  No cervical or inguinal adenopathy  Musc:  No cyanosis or clubbing, ROM limited by pain  Skin:  No rashes or ulcers, skin turgor is good  Neuro:  Cranial nerves are grossly intact, pain and LE motor weakness, follows commands vaguely  Psych:   Moderate insight, oriented to person, place and time, alert    Telemetry reviewed:   normal sinus rhythm  __________________________________________________________________  Medications Reviewed: (see below)  Medications:     Current Facility-Administered Medications   Medication Dose Route Frequency    senna-docusate (PERICOLACE) 8.6-50 mg per tablet 1 Tab  1 Tab Oral DAILY    polyethylene glycol (MIRALAX) packet 17 g  17 g Oral DAILY    LORazepam (ATIVAN) injection 2 mg  2 mg IntraVENous PRN    methylPREDNISolone (MEDROL) tablet 4 mg  4 mg Oral QID WITH MEALS    Followed by   Mindi Pate ON 10/21/2017] methylPREDNISolone (MEDROL) tablet 4 mg  4 mg Oral TID WITH MEALS    Followed by   Tanner Romero ON 10/22/2017] methylPREDNISolone (MEDROL) tablet 4 mg  4 mg Oral ACB/HS    Followed by   Tanner Romero ON 10/23/2017] methylPREDNISolone (MEDROL) tablet 4 mg  4 mg Oral ACB    sodium chloride (NS) flush 5-10 mL  5-10 mL IntraVENous Q8H    sodium chloride (NS) flush 5-10 mL  5-10 mL IntraVENous PRN    dextrose (D50W) injection syrg 12.5-25 g  12.5-25 g IntraVENous PRN    aspirin chewable tablet 81 mg  81 mg Oral DAILY    atorvastatin (LIPITOR) tablet 10 mg  10 mg Oral DAILY    budesonide (PULMICORT) 500 mcg/2 ml nebulizer suspension  500 mcg Nebulization BID RT    fenofibrate nanocrystallized (TRICOR) tablet 145 mg  145 mg Oral QPM    montelukast (SINGULAIR) tablet 10 mg  10 mg Oral QPM    SITagliptin (JANUVIA) tablet tab 25 mg  25 mg Oral DAILY WITH BREAKFAST    zolpidem (AMBIEN) tablet 2.5 mg  2.5 mg Oral QHS    vilazodone (VIIBRYD) tablet 20 mg  20 mg Oral QPM    sodium chloride (NS) flush 5-10 mL  5-10 mL IntraVENous Q8H    sodium chloride (NS) flush 5-10 mL  5-10 mL IntraVENous PRN    heparin (porcine) injection 5,000 Units  5,000 Units SubCUTAneous Q8H    insulin lispro (HUMALOG) injection   SubCUTAneous AC&HS    glucose chewable tablet 16 g  4 Tab Oral PRN    dextrose (D50W) injection syrg 12.5-25 g  12.5-25 g IntraVENous PRN    glucagon (GLUCAGEN) injection 1 mg  1 mg IntraMUSCular PRN    arformoterol (BROVANA) neb solution 15 mcg  15 mcg Nebulization BID RT    influenza vaccine 2017-18 (3 yrs+)(PF) (FLUZONE QUAD/FLUARIX QUAD) injection 0.5 mL  0.5 mL IntraMUSCular PRIOR TO DISCHARGE        Lab Data Reviewed: (see below)  Lab Review:     Recent Labs      10/19/17   0852   WBC  7.7   HGB  11.8*   HCT  37.6   PLT  258     Recent Labs      10/19/17   0852   NA  140   K  4.4   CL  105   CO2  27   GLU  186*   BUN  38*   CREA  1.89*   CA  9.3   MG  2.4     Lab Results   Component Value Date/Time    Glucose (POC) 97 10/20/2017 06:48 AM Glucose (POC) 160 10/19/2017 09:30 PM    Glucose (POC) 131 10/19/2017 04:07 PM    Glucose (POC) 144 10/19/2017 11:17 AM    Glucose (POC) 125 10/19/2017 07:42 AM     No results for input(s): PH, PCO2, PO2, HCO3, FIO2 in the last 72 hours. No results for input(s): INR in the last 72 hours. No lab exists for component: INREXT, INREXT  All Micro Results     None          I have reviewed notes of prior 24hr.     Other pertinent lab: none    Total time spent with patient: Shelly Meyer discussed with: Patient, Care Manager, Nursing Staff, Consultant/Specialist and >50% of time spent in counseling and coordination of care    Discussed:  Care Plan and D/C Planning    Prophylaxis:  H2B/PPI    Disposition:   PT, OT, RN           ___________________________________________________    Attending Physician: Talya Hopkins MD

## 2017-10-20 NOTE — PROGRESS NOTES
Problem: Mobility Impaired (Adult and Pediatric)  Goal: *Acute Goals and Plan of Care (Insert Text)  Physical Therapy Goals  Initiated 10/16/2017  1. Patient will move from supine to sit and sit to supine  in bed with independence within 7 day(s). 2.  Patient will transfer from bed to chair and chair to bed with modified independence using the least restrictive device within 7 day(s). 3.  Patient will perform sit to stand with modified independence within 7 day(s). 4.  Patient will ambulate with modified independence for 150 feet with the least restrictive device within 7 day(s). 5.  Patient will ascend/descend 2 stairs with one handrail(s) with minimal assistance/contact guard assist within 7 day(s). physical Therapy TREATMENT  Patient: Mora Bunch (27 y.o. male)  Date: 10/20/2017  Diagnosis: Weakness  Weakness <principal problem not specified>       Precautions: Fall  Chart, physical therapy assessment, plan of care and goals were reviewed. ASSESSMENT:  Pt comes to sit with min assist.Pt to stand with min assist.Pt ambulated 30ft with RW min assist of 1. Pt complained of left leg weakness. Pt is unsteady at times and needs cues to direct RW. Pt left sitting in chair. Pt progressing slowly. Continue goals. Progression toward goals:  []      Improving appropriately and progressing toward goals  [x]      Improving slowly and progressing toward goals  []      Not making progress toward goals and plan of care will be adjusted     PLAN:  Patient continues to benefit from skilled intervention to address the above impairments. Continue treatment per established plan of care. Discharge Recommendations:  Inpatient Rehab  Further Equipment Recommendations for Discharge:  Rolling walker.      SUBJECTIVE:        OBJECTIVE DATA SUMMARY:   Critical Behavior:  Neurologic State: Alert  Orientation Level: Oriented X4  Cognition: Appropriate decision making  Safety/Judgement: Awareness of environment, Insight into deficits  Functional Mobility Training:  Bed Mobility:     Supine to Sit: Minimum assistance               Transfers:  Sit to Stand: Minimum assistance  Stand to Sit: Contact guard assistance                             Balance:  Sitting: Intact  Standing: Intact; With support  Standing - Static: Fair;Good  Ambulation/Gait Training:  Distance (ft): 30 Feet (ft)  Assistive Device: Walker, rolling;Gait belt  Ambulation - Level of Assistance: Minimal assistance;Contact guard assistance        Gait Abnormalities: Decreased step clearance; Path deviations        Base of Support: Narrowed        Step Length: Left shortened;Right shortened                    Pain:  Pain Scale 1: Numeric (0 - 10)  Pain Intensity 1: 0              Activity Tolerance:   Pt tolerated treatment fairly well. Please refer to the flowsheet for vital signs taken during this treatment.   After treatment:   [x] Patient left in no apparent distress sitting up in chair  [] Patient left in no apparent distress in bed  [] Call bell left within reach  [] Nursing notified  [] Caregiver present  [] Bed alarm activated    COMMUNICATION/COLLABORATION:   The patients plan of care was discussed with: Physical Therapist    Sushma Resendiz PTA   Time Calculation: 23 mins

## 2017-10-20 NOTE — PROGRESS NOTES
Problem: Falls - Risk of  Goal: *Absence of Falls  Document Breonna Fall Risk and appropriate interventions in the flowsheet.    Outcome: Progressing Towards Goal  Fall Risk Interventions:  Mobility Interventions: Bed/chair exit alarm         Medication Interventions: Bed/chair exit alarm    Elimination Interventions: Bed/chair exit alarm             Problem: Ischemic Stroke: Discharge Outcomes  Goal: *Hemodynamically stable  Outcome: Progressing Towards Goal  p  Goal: *Absence of aspiration pneumonia  Outcome: Progressing Towards Goal  p

## 2017-10-21 LAB
25(OH)D3 SERPL-MCNC: 30.8 NG/ML (ref 30–100)
ALBUMIN SERPL-MCNC: 3.1 G/DL (ref 3.5–5)
ALBUMIN/GLOB SERPL: 0.7 {RATIO} (ref 1.1–2.2)
ALP SERPL-CCNC: 82 U/L (ref 45–117)
ALT SERPL-CCNC: 18 U/L (ref 12–78)
ANION GAP SERPL CALC-SCNC: 6 MMOL/L (ref 5–15)
AST SERPL-CCNC: 18 U/L (ref 15–37)
BILIRUB SERPL-MCNC: 0.4 MG/DL (ref 0.2–1)
BUN SERPL-MCNC: 50 MG/DL (ref 6–20)
BUN/CREAT SERPL: 27 (ref 12–20)
CALCIUM SERPL-MCNC: 9.3 MG/DL (ref 8.5–10.1)
CHLORIDE SERPL-SCNC: 104 MMOL/L (ref 97–108)
CO2 SERPL-SCNC: 29 MMOL/L (ref 21–32)
CREAT SERPL-MCNC: 1.82 MG/DL (ref 0.7–1.3)
ERYTHROCYTE [DISTWIDTH] IN BLOOD BY AUTOMATED COUNT: 13.5 % (ref 11.5–14.5)
GLOBULIN SER CALC-MCNC: 4.2 G/DL (ref 2–4)
GLUCOSE SERPL-MCNC: 127 MG/DL (ref 65–100)
HCT VFR BLD AUTO: 38.1 % (ref 36.6–50.3)
HGB BLD-MCNC: 12.2 G/DL (ref 12.1–17)
MAGNESIUM SERPL-MCNC: 2.3 MG/DL (ref 1.6–2.4)
MCH RBC QN AUTO: 28.7 PG (ref 26–34)
MCHC RBC AUTO-ENTMCNC: 32 G/DL (ref 30–36.5)
MCV RBC AUTO: 89.6 FL (ref 80–99)
PLATELET # BLD AUTO: 282 K/UL (ref 150–400)
POTASSIUM SERPL-SCNC: 4.9 MMOL/L (ref 3.5–5.1)
PROT SERPL-MCNC: 7.3 G/DL (ref 6.4–8.2)
RBC # BLD AUTO: 4.25 M/UL (ref 4.1–5.7)
SODIUM SERPL-SCNC: 139 MMOL/L (ref 136–145)
WBC # BLD AUTO: 7 K/UL (ref 4.1–11.1)

## 2017-10-21 PROCEDURE — 74011000250 HC RX REV CODE- 250: Performed by: PHYSICAL MEDICINE & REHABILITATION

## 2017-10-21 PROCEDURE — 36415 COLL VENOUS BLD VENIPUNCTURE: CPT | Performed by: PHYSICAL MEDICINE & REHABILITATION

## 2017-10-21 PROCEDURE — 82306 VITAMIN D 25 HYDROXY: CPT | Performed by: PHYSICAL MEDICINE & REHABILITATION

## 2017-10-21 PROCEDURE — 80053 COMPREHEN METABOLIC PANEL: CPT | Performed by: PHYSICAL MEDICINE & REHABILITATION

## 2017-10-21 PROCEDURE — 74011250637 HC RX REV CODE- 250/637: Performed by: PHYSICAL MEDICINE & REHABILITATION

## 2017-10-21 PROCEDURE — 85027 COMPLETE CBC AUTOMATED: CPT | Performed by: PHYSICAL MEDICINE & REHABILITATION

## 2017-10-21 PROCEDURE — 74011250636 HC RX REV CODE- 250/636: Performed by: PHYSICAL MEDICINE & REHABILITATION

## 2017-10-21 PROCEDURE — 83735 ASSAY OF MAGNESIUM: CPT | Performed by: PHYSICAL MEDICINE & REHABILITATION

## 2017-10-21 RX ADMIN — METHYLPREDNISOLONE 4 MG: 4 TABLET ORAL at 18:25

## 2017-10-21 RX ADMIN — GLIMEPIRIDE 2 MG: 2 TABLET ORAL at 18:25

## 2017-10-21 RX ADMIN — VILAZODONE HYDROCHLORIDE 20 MG: 20 TABLET ORAL at 18:24

## 2017-10-21 RX ADMIN — SITAGLIPTIN 25 MG: 25 TABLET, FILM COATED ORAL at 09:49

## 2017-10-21 RX ADMIN — DOCUSATE SODIUM AND SENNOSIDES 1 TABLET: 8.6; 5 TABLET, FILM COATED ORAL at 09:49

## 2017-10-21 RX ADMIN — DOCUSATE SODIUM AND SENNOSIDES 1 TABLET: 8.6; 5 TABLET, FILM COATED ORAL at 20:56

## 2017-10-21 RX ADMIN — ARFORMOTEROL TARTRATE 15 MCG: 15 SOLUTION RESPIRATORY (INHALATION) at 20:56

## 2017-10-21 RX ADMIN — METHYLPREDNISOLONE 4 MG: 4 TABLET ORAL at 13:23

## 2017-10-21 RX ADMIN — FENOFIBRATE 145 MG: 145 TABLET ORAL at 18:25

## 2017-10-21 RX ADMIN — BUDESONIDE 500 MCG: 0.5 INHALANT RESPIRATORY (INHALATION) at 20:56

## 2017-10-21 RX ADMIN — METHYLPREDNISOLONE 4 MG: 4 TABLET ORAL at 09:49

## 2017-10-21 RX ADMIN — ATORVASTATIN CALCIUM 10 MG: 10 TABLET, FILM COATED ORAL at 09:49

## 2017-10-21 RX ADMIN — ZOLPIDEM TARTRATE 2.5 MG: 5 TABLET ORAL at 20:56

## 2017-10-21 RX ADMIN — HEPARIN SODIUM 5000 UNITS: 5000 INJECTION, SOLUTION INTRAVENOUS; SUBCUTANEOUS at 22:00

## 2017-10-21 RX ADMIN — DOCUSATE SODIUM 100 MG: 100 CAPSULE, LIQUID FILLED ORAL at 09:49

## 2017-10-21 RX ADMIN — MONTELUKAST SODIUM 10 MG: 10 TABLET, FILM COATED ORAL at 18:25

## 2017-10-21 RX ADMIN — ASPIRIN 81 MG 81 MG: 81 TABLET ORAL at 09:49

## 2017-10-21 RX ADMIN — BUDESONIDE 500 MCG: 0.5 INHALANT RESPIRATORY (INHALATION) at 09:49

## 2017-10-21 RX ADMIN — HEPARIN SODIUM 5000 UNITS: 5000 INJECTION, SOLUTION INTRAVENOUS; SUBCUTANEOUS at 13:23

## 2017-10-21 RX ADMIN — DOCUSATE SODIUM 100 MG: 100 CAPSULE, LIQUID FILLED ORAL at 20:56

## 2017-10-21 RX ADMIN — HEPARIN SODIUM 5000 UNITS: 5000 INJECTION, SOLUTION INTRAVENOUS; SUBCUTANEOUS at 06:32

## 2017-10-21 RX ADMIN — ARFORMOTEROL TARTRATE 15 MCG: 15 SOLUTION RESPIRATORY (INHALATION) at 09:59

## 2017-10-22 LAB
BACTERIA SPEC CULT: NORMAL
CC UR VC: NORMAL
SERVICE CMNT-IMP: NORMAL

## 2017-10-22 PROCEDURE — 74011250637 HC RX REV CODE- 250/637: Performed by: PHYSICAL MEDICINE & REHABILITATION

## 2017-10-22 PROCEDURE — 74011250636 HC RX REV CODE- 250/636: Performed by: PHYSICAL MEDICINE & REHABILITATION

## 2017-10-22 PROCEDURE — 74011000250 HC RX REV CODE- 250: Performed by: PHYSICAL MEDICINE & REHABILITATION

## 2017-10-22 RX ADMIN — HEPARIN SODIUM 5000 UNITS: 5000 INJECTION, SOLUTION INTRAVENOUS; SUBCUTANEOUS at 22:22

## 2017-10-22 RX ADMIN — SITAGLIPTIN 25 MG: 25 TABLET, FILM COATED ORAL at 09:40

## 2017-10-22 RX ADMIN — POLYETHYLENE GLYCOL 3350 17 G: 17 POWDER, FOR SOLUTION ORAL at 09:41

## 2017-10-22 RX ADMIN — BUDESONIDE 500 MCG: 0.5 INHALANT RESPIRATORY (INHALATION) at 09:40

## 2017-10-22 RX ADMIN — ZOLPIDEM TARTRATE 2.5 MG: 5 TABLET ORAL at 22:22

## 2017-10-22 RX ADMIN — ASPIRIN 81 MG 81 MG: 81 TABLET ORAL at 09:41

## 2017-10-22 RX ADMIN — HEPARIN SODIUM 5000 UNITS: 5000 INJECTION, SOLUTION INTRAVENOUS; SUBCUTANEOUS at 14:06

## 2017-10-22 RX ADMIN — DOCUSATE SODIUM AND SENNOSIDES 1 TABLET: 8.6; 5 TABLET, FILM COATED ORAL at 09:40

## 2017-10-22 RX ADMIN — METHYLPREDNISOLONE 4 MG: 4 TABLET ORAL at 09:41

## 2017-10-22 RX ADMIN — METHYLPREDNISOLONE 4 MG: 4 TABLET ORAL at 22:21

## 2017-10-22 RX ADMIN — GLIMEPIRIDE 2 MG: 2 TABLET ORAL at 18:16

## 2017-10-22 RX ADMIN — ARFORMOTEROL TARTRATE 15 MCG: 15 SOLUTION RESPIRATORY (INHALATION) at 09:41

## 2017-10-22 RX ADMIN — FENOFIBRATE 145 MG: 145 TABLET ORAL at 18:16

## 2017-10-22 RX ADMIN — ATORVASTATIN CALCIUM 10 MG: 10 TABLET, FILM COATED ORAL at 09:41

## 2017-10-22 RX ADMIN — VILAZODONE HYDROCHLORIDE 20 MG: 20 TABLET ORAL at 18:19

## 2017-10-22 RX ADMIN — BUDESONIDE 500 MCG: 0.5 INHALANT RESPIRATORY (INHALATION) at 22:21

## 2017-10-22 RX ADMIN — HEPARIN SODIUM 5000 UNITS: 5000 INJECTION, SOLUTION INTRAVENOUS; SUBCUTANEOUS at 05:42

## 2017-10-22 RX ADMIN — ARFORMOTEROL TARTRATE 15 MCG: 15 SOLUTION RESPIRATORY (INHALATION) at 22:21

## 2017-10-22 RX ADMIN — DOCUSATE SODIUM AND SENNOSIDES 1 TABLET: 8.6; 5 TABLET, FILM COATED ORAL at 22:22

## 2017-10-22 RX ADMIN — DOCUSATE SODIUM 100 MG: 100 CAPSULE, LIQUID FILLED ORAL at 09:40

## 2017-10-22 RX ADMIN — DOCUSATE SODIUM 100 MG: 100 CAPSULE, LIQUID FILLED ORAL at 22:22

## 2017-10-22 RX ADMIN — MONTELUKAST SODIUM 10 MG: 10 TABLET, FILM COATED ORAL at 18:16

## 2017-10-23 LAB
ANION GAP SERPL CALC-SCNC: 9 MMOL/L (ref 5–15)
BASOPHILS # BLD: 0 K/UL (ref 0–0.1)
BASOPHILS NFR BLD: 0 % (ref 0–1)
BUN SERPL-MCNC: 61 MG/DL (ref 6–20)
BUN/CREAT SERPL: 30 (ref 12–20)
CALCIUM SERPL-MCNC: 9.3 MG/DL (ref 8.5–10.1)
CHLORIDE SERPL-SCNC: 104 MMOL/L (ref 97–108)
CO2 SERPL-SCNC: 28 MMOL/L (ref 21–32)
CREAT SERPL-MCNC: 2.02 MG/DL (ref 0.7–1.3)
EOSINOPHIL # BLD: 0.1 K/UL (ref 0–0.4)
EOSINOPHIL NFR BLD: 2 % (ref 0–7)
ERYTHROCYTE [DISTWIDTH] IN BLOOD BY AUTOMATED COUNT: 13.4 % (ref 11.5–14.5)
GLUCOSE SERPL-MCNC: 106 MG/DL (ref 65–100)
HCT VFR BLD AUTO: 38.5 % (ref 36.6–50.3)
HGB BLD-MCNC: 12.4 G/DL (ref 12.1–17)
LYMPHOCYTES # BLD: 0.9 K/UL (ref 0.8–3.5)
LYMPHOCYTES NFR BLD: 12 % (ref 12–49)
MCH RBC QN AUTO: 28.7 PG (ref 26–34)
MCHC RBC AUTO-ENTMCNC: 32.2 G/DL (ref 30–36.5)
MCV RBC AUTO: 89.1 FL (ref 80–99)
MONOCYTES # BLD: 0.7 K/UL (ref 0–1)
MONOCYTES NFR BLD: 10 % (ref 5–13)
NEUTS SEG # BLD: 5.6 K/UL (ref 1.8–8)
NEUTS SEG NFR BLD: 76 % (ref 32–75)
PLATELET # BLD AUTO: 278 K/UL (ref 150–400)
POTASSIUM SERPL-SCNC: 4.2 MMOL/L (ref 3.5–5.1)
RBC # BLD AUTO: 4.32 M/UL (ref 4.1–5.7)
SODIUM SERPL-SCNC: 141 MMOL/L (ref 136–145)
WBC # BLD AUTO: 7.3 K/UL (ref 4.1–11.1)

## 2017-10-23 PROCEDURE — 80048 BASIC METABOLIC PNL TOTAL CA: CPT | Performed by: PHYSICAL MEDICINE & REHABILITATION

## 2017-10-23 PROCEDURE — 74011250636 HC RX REV CODE- 250/636: Performed by: PHYSICAL MEDICINE & REHABILITATION

## 2017-10-23 PROCEDURE — 36415 COLL VENOUS BLD VENIPUNCTURE: CPT | Performed by: PHYSICAL MEDICINE & REHABILITATION

## 2017-10-23 PROCEDURE — 74011250637 HC RX REV CODE- 250/637: Performed by: PHYSICAL MEDICINE & REHABILITATION

## 2017-10-23 PROCEDURE — 85025 COMPLETE CBC W/AUTO DIFF WBC: CPT | Performed by: PHYSICAL MEDICINE & REHABILITATION

## 2017-10-23 PROCEDURE — 74011000250 HC RX REV CODE- 250: Performed by: PHYSICAL MEDICINE & REHABILITATION

## 2017-10-23 RX ADMIN — BUDESONIDE 500 MCG: 0.5 INHALANT RESPIRATORY (INHALATION) at 09:34

## 2017-10-23 RX ADMIN — SITAGLIPTIN 25 MG: 25 TABLET, FILM COATED ORAL at 09:34

## 2017-10-23 RX ADMIN — ZOLPIDEM TARTRATE 2.5 MG: 5 TABLET ORAL at 21:40

## 2017-10-23 RX ADMIN — DOCUSATE SODIUM 100 MG: 100 CAPSULE, LIQUID FILLED ORAL at 09:34

## 2017-10-23 RX ADMIN — ARFORMOTEROL TARTRATE 15 MCG: 15 SOLUTION RESPIRATORY (INHALATION) at 09:34

## 2017-10-23 RX ADMIN — MONTELUKAST SODIUM 10 MG: 10 TABLET, FILM COATED ORAL at 18:32

## 2017-10-23 RX ADMIN — FENOFIBRATE 145 MG: 145 TABLET ORAL at 18:32

## 2017-10-23 RX ADMIN — HEPARIN SODIUM 5000 UNITS: 5000 INJECTION, SOLUTION INTRAVENOUS; SUBCUTANEOUS at 18:33

## 2017-10-23 RX ADMIN — ASPIRIN 81 MG 81 MG: 81 TABLET ORAL at 09:34

## 2017-10-23 RX ADMIN — DOCUSATE SODIUM AND SENNOSIDES 1 TABLET: 8.6; 5 TABLET, FILM COATED ORAL at 09:34

## 2017-10-23 RX ADMIN — ARFORMOTEROL TARTRATE 15 MCG: 15 SOLUTION RESPIRATORY (INHALATION) at 21:40

## 2017-10-23 RX ADMIN — DOCUSATE SODIUM 100 MG: 100 CAPSULE, LIQUID FILLED ORAL at 21:40

## 2017-10-23 RX ADMIN — HEPARIN SODIUM 5000 UNITS: 5000 INJECTION, SOLUTION INTRAVENOUS; SUBCUTANEOUS at 05:22

## 2017-10-23 RX ADMIN — VILAZODONE HYDROCHLORIDE 20 MG: 20 TABLET ORAL at 18:32

## 2017-10-23 RX ADMIN — METHYLPREDNISOLONE 4 MG: 4 TABLET ORAL at 09:34

## 2017-10-23 RX ADMIN — HEPARIN SODIUM 5000 UNITS: 5000 INJECTION, SOLUTION INTRAVENOUS; SUBCUTANEOUS at 21:40

## 2017-10-23 RX ADMIN — GLIMEPIRIDE 2 MG: 2 TABLET ORAL at 18:32

## 2017-10-23 RX ADMIN — DOCUSATE SODIUM AND SENNOSIDES 1 TABLET: 8.6; 5 TABLET, FILM COATED ORAL at 21:40

## 2017-10-23 RX ADMIN — BUDESONIDE 500 MCG: 0.5 INHALANT RESPIRATORY (INHALATION) at 21:40

## 2017-10-23 RX ADMIN — ATORVASTATIN CALCIUM 10 MG: 10 TABLET, FILM COATED ORAL at 09:34

## 2017-10-23 NOTE — CONSULTS
Marquis Garcia suz[address] [place] [address]                                                       Renal Physician Consult Note     Patient: Johnson cMdermott MRN: [id number]  Moshe Metz MD   YOB: 1927  Age: 80 y.o. Sex: male      ADMITTED:  10/20/2017 to Jean-Claude Phillips MD  for LUMBAR OA  ADMIT DATE:10/20/2017    CONSULTATION DATE:10/23/2017     REASON FOR CONSULTATION: I have been asked to see this patient   by [place] for Advise/Opinion for Renal Failure    DISCUSSION:   Acute kidney injury due to unclear issues. Patient is nonoliguric. Creat up from  1.82 mg% on 10/21/17 to 2.02 mg% on 10 23/17. This is within prior range noted at [facility]  I.e., no AKF. Stable CKD. ·   ASSESSMENT:   · Stable CKD  Active Problems:    * No active hospital problems. *       PLAN:   · No changes. · Serial labs  · Watch volume. Subjective:   HPI: Johnson Mcdermott is a 80 y.o. male who has been admitted to the hospital for rehab. Patient is found to have creatinine of 1.8-20 mg%. Patient has no episode of hypotension. Patient has no history of exposure to intravenous iodinated contrast.   Patient has no history of significant NSAID use. Patient indicates + previous knowledge of renal failure. The patient no a previous history of hematuria; there is no history of proteinuria. The patient has no history of nephrolithiasis and no knowledge of pyelonephritis. Patient has  no history of hepatitis, jaundice. Patient has  no history of phospho soda use. Patient has  no history of herbal medication usage. Review of Systems: Total of 11 systems reviewed they are negative except per HPI. No Known Allergies    PMHx:  has a past medical history of Chronic obstructive pulmonary disease ([address] DM type 2 causing renal disease ([address] and Hyperlipidemia. PSurgHx:  has a past surgical history that includes cholecystectomy; orthopaedic; and heent.   PSocHx:  reports that he quit smoking about 50 years ago. He has a 10.00 pack-year smoking history. He has never used smokeless tobacco. He reports that he does not drink alcohol or use illicit drugs. Family History   Problem Relation Age of Onset    Diabetes Mother           Objective:    Vitals:    Vitals:    10/20/17 1840   Weight: 95 kg (209 lb 6 oz)   Height: 5' 9\" (1.753 m)     I&O's:     Physical Exam:  General:Alert, No distress,   Eyes:No scleral icterus, No conjunctival pallor  Neck:Supple,no mass palpable,no thyromegaly  Lungs:Clears to auscultation Bilaterally, normal respiratory effort  CVS:RRR, S1 S2 normal,  No rub, no LE edema  Abdomen:Soft, Non tender, No hepatosplenomegaly  Extremities:No cyanosis, No clubbing, can't access gait at present. Skin:No rash or lesions, Warm and DRY   Psych: AAO X 3, appropriate affect   : not done  NEURO: nonfocal            Care Plan discussed with:  pt   Chart reviewed.   Lab Data Personally Reviewed: (see below)  Recent Labs      10/23/17   0335  10/21/17   0418   WBC  7.3  7.0   HGB  12.4  12.2   HCT  38.5  38.1   PLT  278  282     Recent Labs      10/23/17   0335  10/21/17   0418   NA  141  139   K  4.2  4.9   CL  104  104   CO2  28  29   BUN  61*  50*   CREA  2.02*  1.82*   GLU  106*  127*   CA  9.3  9.3   MG   --   2.3     Lab Results   Component Value Date/Time    Color YELLOW/STRAW 10/20/2017 06:30 PM    Appearance CLEAR 10/20/2017 06:30 PM    Specific gravity 1.011 10/20/2017 06:30 PM    pH (UA) 5.5 10/20/2017 06:30 PM    Protein NEGATIVE  10/20/2017 06:30 PM    Glucose NEGATIVE  10/20/2017 06:30 PM    Ketone NEGATIVE  10/20/2017 06:30 PM    Bilirubin NEGATIVE  10/20/2017 06:30 PM    Urobilinogen 1.0 10/20/2017 06:30 PM    Nitrites NEGATIVE  10/20/2017 06:30 PM    Leukocyte Esterase NEGATIVE  10/20/2017 06:30 PM    Epithelial cells FEW 10/20/2017 06:30 PM    Bacteria NEGATIVE  10/20/2017 06:30 PM    WBC 0-4 10/20/2017 06:30 PM    RBC 0-5 10/20/2017 06:30 PM     No results found for: SDES  Lab Results   Component Value Date/Time    Culture result: MIXED UROGENITAL ZULAY ISOLATED 10/20/2017 06:30 PM     Prior to Admission Medications   Prescriptions Last Dose Informant Patient Reported? Taking? SITagliptin (JANUVIA) 50 mg tablet   No No   Sig: Take 1 Tab by mouth daily. Vit A,C,E-Zinc-Copper (PRESERVISION AREDS) cap capsule  Self Yes No   Sig: Take 1 Cap by mouth two (2) times a day. arformoterol (BROVANA) 15 mcg/2 mL nebu neb solution  Self Yes No   Sig: 15 mcg by Nebulization route two (2) times a day. Use with budesonide (Pulmicort) nebs   aspirin 81 mg chewable tablet  Self Yes No   Sig: Take 81 mg by mouth daily. atorvastatin (LIPITOR) 10 mg tablet  Self Yes No   Sig: Take 10 mg by mouth daily. budesonide (PULMICORT) 0.5 mg/2 mL nbsp  Self Yes No   Si mcg by Nebulization route two (2) times a day. Use with arformoterol (Brovana) nebs   fenofibrate nanocrystallized (TRICOR) 145 mg tablet  Self Yes No   Sig: Take 145 mg by mouth every evening. glimepiride (AMARYL) 2 mg tablet  Self Yes No   Sig: Take 2 mg by mouth every evening. methylPREDNISolone (MEDROL) 4 mg tab   No No   Sig: Take 1 tab 3 times a day for 1 day, then 2 times a day for 1 days, then 1 per day. montelukast (SINGULAIR) 10 mg tablet  Self Yes No   Sig: Take 10 mg by mouth every evening. senna-docusate (PERICOLACE) 8.6-50 mg per tablet   No No   Sig: Take 1 Tab by mouth daily for 30 days. vilazodone (VIIBRYD) 10 mg tab tablet  Self Yes No   Sig: Take 20 mg by mouth every evening. zolpidem (AMBIEN) 5 mg tablet  Self Yes No   Sig: Take 2.5 mg by mouth nightly. Facility-Administered Medications: None     Current Medications list Personally Reviewed   [x]     Yes        []           No      Thank you for allowing us to participate in the care this patient. We will follow patient with you.   Signed By: Nela Mooney MD                         10/23/2017    [facility]  [id number] [facility], [address] [place] [address]  Phone - [phone]  Fax - [phone]  [email or web address]

## 2017-10-24 LAB
ALBUMIN SERPL-MCNC: 3.1 G/DL (ref 3.5–5)
ALBUMIN/GLOB SERPL: 0.8 {RATIO} (ref 1.1–2.2)
ALP SERPL-CCNC: 77 U/L (ref 45–117)
ALT SERPL-CCNC: 17 U/L (ref 12–78)
ANION GAP SERPL CALC-SCNC: 10 MMOL/L (ref 5–15)
AST SERPL-CCNC: 18 U/L (ref 15–37)
BASOPHILS # BLD: 0 K/UL (ref 0–0.1)
BASOPHILS NFR BLD: 1 % (ref 0–1)
BILIRUB SERPL-MCNC: 0.5 MG/DL (ref 0.2–1)
BUN SERPL-MCNC: 56 MG/DL (ref 6–20)
BUN/CREAT SERPL: 28 (ref 12–20)
CALCIUM SERPL-MCNC: 9.4 MG/DL (ref 8.5–10.1)
CHLORIDE SERPL-SCNC: 105 MMOL/L (ref 97–108)
CO2 SERPL-SCNC: 28 MMOL/L (ref 21–32)
CREAT SERPL-MCNC: 2 MG/DL (ref 0.7–1.3)
EOSINOPHIL # BLD: 0.2 K/UL (ref 0–0.4)
EOSINOPHIL NFR BLD: 3 % (ref 0–7)
ERYTHROCYTE [DISTWIDTH] IN BLOOD BY AUTOMATED COUNT: 13.5 % (ref 11.5–14.5)
EST. AVERAGE GLUCOSE BLD GHB EST-MCNC: 146 MG/DL
GLOBULIN SER CALC-MCNC: 4 G/DL (ref 2–4)
GLUCOSE SERPL-MCNC: 83 MG/DL (ref 65–100)
HBA1C MFR BLD: 6.7 % (ref 4.2–6.3)
HCT VFR BLD AUTO: 38 % (ref 36.6–50.3)
HGB BLD-MCNC: 12.4 G/DL (ref 12.1–17)
LYMPHOCYTES # BLD: 1 K/UL (ref 0.8–3.5)
LYMPHOCYTES NFR BLD: 14 % (ref 12–49)
MCH RBC QN AUTO: 28.9 PG (ref 26–34)
MCHC RBC AUTO-ENTMCNC: 32.6 G/DL (ref 30–36.5)
MCV RBC AUTO: 88.6 FL (ref 80–99)
MONOCYTES # BLD: 0.7 K/UL (ref 0–1)
MONOCYTES NFR BLD: 10 % (ref 5–13)
NEUTS SEG # BLD: 5.1 K/UL (ref 1.8–8)
NEUTS SEG NFR BLD: 72 % (ref 32–75)
PLATELET # BLD AUTO: 276 K/UL (ref 150–400)
POTASSIUM SERPL-SCNC: 4.1 MMOL/L (ref 3.5–5.1)
PROT SERPL-MCNC: 7.1 G/DL (ref 6.4–8.2)
RBC # BLD AUTO: 4.29 M/UL (ref 4.1–5.7)
SODIUM SERPL-SCNC: 143 MMOL/L (ref 136–145)
WBC # BLD AUTO: 7.1 K/UL (ref 4.1–11.1)

## 2017-10-24 PROCEDURE — 80053 COMPREHEN METABOLIC PANEL: CPT | Performed by: PHYSICAL MEDICINE & REHABILITATION

## 2017-10-24 PROCEDURE — 36415 COLL VENOUS BLD VENIPUNCTURE: CPT | Performed by: PHYSICAL MEDICINE & REHABILITATION

## 2017-10-24 PROCEDURE — 74011250636 HC RX REV CODE- 250/636: Performed by: PHYSICAL MEDICINE & REHABILITATION

## 2017-10-24 PROCEDURE — 74011250637 HC RX REV CODE- 250/637: Performed by: PHYSICAL MEDICINE & REHABILITATION

## 2017-10-24 PROCEDURE — 74011000250 HC RX REV CODE- 250: Performed by: PHYSICAL MEDICINE & REHABILITATION

## 2017-10-24 PROCEDURE — 83036 HEMOGLOBIN GLYCOSYLATED A1C: CPT | Performed by: PHYSICAL MEDICINE & REHABILITATION

## 2017-10-24 PROCEDURE — 85025 COMPLETE CBC W/AUTO DIFF WBC: CPT | Performed by: PHYSICAL MEDICINE & REHABILITATION

## 2017-10-24 RX ADMIN — VILAZODONE HYDROCHLORIDE 20 MG: 20 TABLET ORAL at 18:03

## 2017-10-24 RX ADMIN — HEPARIN SODIUM 5000 UNITS: 5000 INJECTION, SOLUTION INTRAVENOUS; SUBCUTANEOUS at 18:04

## 2017-10-24 RX ADMIN — MAGNESIUM HYDROXIDE 30 ML: 400 SUSPENSION ORAL at 21:36

## 2017-10-24 RX ADMIN — ATORVASTATIN CALCIUM 10 MG: 10 TABLET, FILM COATED ORAL at 08:41

## 2017-10-24 RX ADMIN — BUDESONIDE 500 MCG: 0.5 INHALANT RESPIRATORY (INHALATION) at 08:41

## 2017-10-24 RX ADMIN — BUDESONIDE 500 MCG: 0.5 INHALANT RESPIRATORY (INHALATION) at 21:37

## 2017-10-24 RX ADMIN — HEPARIN SODIUM 5000 UNITS: 5000 INJECTION, SOLUTION INTRAVENOUS; SUBCUTANEOUS at 21:37

## 2017-10-24 RX ADMIN — GLIMEPIRIDE 2 MG: 2 TABLET ORAL at 18:03

## 2017-10-24 RX ADMIN — DOCUSATE SODIUM 100 MG: 100 CAPSULE, LIQUID FILLED ORAL at 21:37

## 2017-10-24 RX ADMIN — DOCUSATE SODIUM AND SENNOSIDES 1 TABLET: 8.6; 5 TABLET, FILM COATED ORAL at 08:41

## 2017-10-24 RX ADMIN — SITAGLIPTIN 25 MG: 25 TABLET, FILM COATED ORAL at 08:41

## 2017-10-24 RX ADMIN — FENOFIBRATE 145 MG: 145 TABLET ORAL at 18:03

## 2017-10-24 RX ADMIN — DOCUSATE SODIUM AND SENNOSIDES 1 TABLET: 8.6; 5 TABLET, FILM COATED ORAL at 21:37

## 2017-10-24 RX ADMIN — DOCUSATE SODIUM 100 MG: 100 CAPSULE, LIQUID FILLED ORAL at 08:41

## 2017-10-24 RX ADMIN — ARFORMOTEROL TARTRATE 15 MCG: 15 SOLUTION RESPIRATORY (INHALATION) at 08:41

## 2017-10-24 RX ADMIN — ARFORMOTEROL TARTRATE 15 MCG: 15 SOLUTION RESPIRATORY (INHALATION) at 21:37

## 2017-10-24 RX ADMIN — ASPIRIN 81 MG 81 MG: 81 TABLET ORAL at 08:41

## 2017-10-24 RX ADMIN — HEPARIN SODIUM 5000 UNITS: 5000 INJECTION, SOLUTION INTRAVENOUS; SUBCUTANEOUS at 05:47

## 2017-10-24 RX ADMIN — MONTELUKAST SODIUM 10 MG: 10 TABLET, FILM COATED ORAL at 18:03

## 2017-10-24 RX ADMIN — ZOLPIDEM TARTRATE 2.5 MG: 5 TABLET ORAL at 21:37

## 2017-10-25 PROCEDURE — 74011000250 HC RX REV CODE- 250: Performed by: PHYSICAL MEDICINE & REHABILITATION

## 2017-10-25 PROCEDURE — 74011250637 HC RX REV CODE- 250/637: Performed by: PHYSICAL MEDICINE & REHABILITATION

## 2017-10-25 PROCEDURE — 74011250636 HC RX REV CODE- 250/636: Performed by: PHYSICAL MEDICINE & REHABILITATION

## 2017-10-25 RX ADMIN — ZOLPIDEM TARTRATE 2.5 MG: 5 TABLET ORAL at 20:56

## 2017-10-25 RX ADMIN — DOCUSATE SODIUM AND SENNOSIDES 1 TABLET: 8.6; 5 TABLET, FILM COATED ORAL at 20:54

## 2017-10-25 RX ADMIN — FENOFIBRATE 145 MG: 145 TABLET ORAL at 17:14

## 2017-10-25 RX ADMIN — SITAGLIPTIN 25 MG: 25 TABLET, FILM COATED ORAL at 08:13

## 2017-10-25 RX ADMIN — ARFORMOTEROL TARTRATE 15 MCG: 15 SOLUTION RESPIRATORY (INHALATION) at 08:13

## 2017-10-25 RX ADMIN — MONTELUKAST SODIUM 10 MG: 10 TABLET, FILM COATED ORAL at 17:14

## 2017-10-25 RX ADMIN — ATORVASTATIN CALCIUM 10 MG: 10 TABLET, FILM COATED ORAL at 08:13

## 2017-10-25 RX ADMIN — HEPARIN SODIUM 5000 UNITS: 5000 INJECTION, SOLUTION INTRAVENOUS; SUBCUTANEOUS at 20:56

## 2017-10-25 RX ADMIN — VILAZODONE HYDROCHLORIDE 20 MG: 20 TABLET ORAL at 17:14

## 2017-10-25 RX ADMIN — BUDESONIDE 500 MCG: 0.5 INHALANT RESPIRATORY (INHALATION) at 20:53

## 2017-10-25 RX ADMIN — HEPARIN SODIUM 5000 UNITS: 5000 INJECTION, SOLUTION INTRAVENOUS; SUBCUTANEOUS at 05:46

## 2017-10-25 RX ADMIN — ARFORMOTEROL TARTRATE 15 MCG: 15 SOLUTION RESPIRATORY (INHALATION) at 22:30

## 2017-10-25 RX ADMIN — ASPIRIN 81 MG 81 MG: 81 TABLET ORAL at 08:13

## 2017-10-25 RX ADMIN — GLIMEPIRIDE 2 MG: 2 TABLET ORAL at 17:14

## 2017-10-25 RX ADMIN — DOCUSATE SODIUM AND SENNOSIDES 1 TABLET: 8.6; 5 TABLET, FILM COATED ORAL at 08:13

## 2017-10-25 RX ADMIN — HEPARIN SODIUM 5000 UNITS: 5000 INJECTION, SOLUTION INTRAVENOUS; SUBCUTANEOUS at 15:00

## 2017-10-25 RX ADMIN — BUDESONIDE 500 MCG: 0.5 INHALANT RESPIRATORY (INHALATION) at 08:13

## 2017-10-25 RX ADMIN — DOCUSATE SODIUM 100 MG: 100 CAPSULE, LIQUID FILLED ORAL at 08:13

## 2017-10-25 RX ADMIN — DOCUSATE SODIUM 100 MG: 100 CAPSULE, LIQUID FILLED ORAL at 20:54

## 2017-10-25 RX ADMIN — POLYETHYLENE GLYCOL 3350 17 G: 17 POWDER, FOR SOLUTION ORAL at 08:14

## 2017-10-26 LAB
ANION GAP SERPL CALC-SCNC: 9 MMOL/L (ref 5–15)
BUN SERPL-MCNC: 51 MG/DL (ref 6–20)
BUN/CREAT SERPL: 24 (ref 12–20)
CALCIUM SERPL-MCNC: 9.4 MG/DL (ref 8.5–10.1)
CHLORIDE SERPL-SCNC: 104 MMOL/L (ref 97–108)
CO2 SERPL-SCNC: 28 MMOL/L (ref 21–32)
CREAT SERPL-MCNC: 2.15 MG/DL (ref 0.7–1.3)
GLUCOSE SERPL-MCNC: 101 MG/DL (ref 65–100)
POTASSIUM SERPL-SCNC: 4.3 MMOL/L (ref 3.5–5.1)
SODIUM SERPL-SCNC: 141 MMOL/L (ref 136–145)

## 2017-10-26 PROCEDURE — 36415 COLL VENOUS BLD VENIPUNCTURE: CPT | Performed by: PHYSICAL MEDICINE & REHABILITATION

## 2017-10-26 PROCEDURE — 74011250636 HC RX REV CODE- 250/636: Performed by: PHYSICAL MEDICINE & REHABILITATION

## 2017-10-26 PROCEDURE — 74011250637 HC RX REV CODE- 250/637: Performed by: PHYSICAL MEDICINE & REHABILITATION

## 2017-10-26 PROCEDURE — 74011000250 HC RX REV CODE- 250: Performed by: PHYSICAL MEDICINE & REHABILITATION

## 2017-10-26 PROCEDURE — 80048 BASIC METABOLIC PNL TOTAL CA: CPT | Performed by: PHYSICAL MEDICINE & REHABILITATION

## 2017-10-26 RX ORDER — INSULIN LISPRO 100 [IU]/ML
INJECTION, SOLUTION INTRAVENOUS; SUBCUTANEOUS
Status: DISCONTINUED | OUTPATIENT
Start: 2017-10-27 | End: 2017-11-03 | Stop reason: HOSPADM

## 2017-10-26 RX ADMIN — GLIMEPIRIDE 2 MG: 2 TABLET ORAL at 17:35

## 2017-10-26 RX ADMIN — ATORVASTATIN CALCIUM 10 MG: 10 TABLET, FILM COATED ORAL at 08:22

## 2017-10-26 RX ADMIN — ARFORMOTEROL TARTRATE 15 MCG: 15 SOLUTION RESPIRATORY (INHALATION) at 21:38

## 2017-10-26 RX ADMIN — VILAZODONE HYDROCHLORIDE 20 MG: 20 TABLET ORAL at 17:35

## 2017-10-26 RX ADMIN — ASPIRIN 81 MG 81 MG: 81 TABLET ORAL at 08:22

## 2017-10-26 RX ADMIN — DOCUSATE SODIUM 100 MG: 100 CAPSULE, LIQUID FILLED ORAL at 08:22

## 2017-10-26 RX ADMIN — ARFORMOTEROL TARTRATE 15 MCG: 15 SOLUTION RESPIRATORY (INHALATION) at 08:31

## 2017-10-26 RX ADMIN — DOCUSATE SODIUM AND SENNOSIDES 1 TABLET: 8.6; 5 TABLET, FILM COATED ORAL at 08:22

## 2017-10-26 RX ADMIN — FENOFIBRATE 145 MG: 145 TABLET ORAL at 17:34

## 2017-10-26 RX ADMIN — DOCUSATE SODIUM AND SENNOSIDES 1 TABLET: 8.6; 5 TABLET, FILM COATED ORAL at 21:37

## 2017-10-26 RX ADMIN — MONTELUKAST SODIUM 10 MG: 10 TABLET, FILM COATED ORAL at 17:05

## 2017-10-26 RX ADMIN — ZOLPIDEM TARTRATE 2.5 MG: 5 TABLET ORAL at 21:37

## 2017-10-26 RX ADMIN — DOCUSATE SODIUM 100 MG: 100 CAPSULE, LIQUID FILLED ORAL at 21:37

## 2017-10-26 RX ADMIN — SITAGLIPTIN 25 MG: 25 TABLET, FILM COATED ORAL at 08:22

## 2017-10-26 RX ADMIN — HEPARIN SODIUM 5000 UNITS: 5000 INJECTION, SOLUTION INTRAVENOUS; SUBCUTANEOUS at 06:37

## 2017-10-26 RX ADMIN — BUDESONIDE 500 MCG: 0.5 INHALANT RESPIRATORY (INHALATION) at 21:38

## 2017-10-26 RX ADMIN — HEPARIN SODIUM 5000 UNITS: 5000 INJECTION, SOLUTION INTRAVENOUS; SUBCUTANEOUS at 14:15

## 2017-10-26 RX ADMIN — BUDESONIDE 500 MCG: 0.5 INHALANT RESPIRATORY (INHALATION) at 08:31

## 2017-10-26 RX ADMIN — HEPARIN SODIUM 5000 UNITS: 5000 INJECTION, SOLUTION INTRAVENOUS; SUBCUTANEOUS at 21:38

## 2017-10-27 PROCEDURE — 74011250636 HC RX REV CODE- 250/636: Performed by: PHYSICAL MEDICINE & REHABILITATION

## 2017-10-27 PROCEDURE — 74011250637 HC RX REV CODE- 250/637: Performed by: PHYSICAL MEDICINE & REHABILITATION

## 2017-10-27 PROCEDURE — 74011000250 HC RX REV CODE- 250: Performed by: PHYSICAL MEDICINE & REHABILITATION

## 2017-10-27 RX ADMIN — FENOFIBRATE 145 MG: 145 TABLET ORAL at 17:10

## 2017-10-27 RX ADMIN — ZOLPIDEM TARTRATE 2.5 MG: 5 TABLET ORAL at 20:45

## 2017-10-27 RX ADMIN — BUDESONIDE 500 MCG: 0.5 INHALANT RESPIRATORY (INHALATION) at 20:45

## 2017-10-27 RX ADMIN — DOCUSATE SODIUM AND SENNOSIDES 1 TABLET: 8.6; 5 TABLET, FILM COATED ORAL at 09:37

## 2017-10-27 RX ADMIN — MONTELUKAST SODIUM 10 MG: 10 TABLET, FILM COATED ORAL at 17:10

## 2017-10-27 RX ADMIN — ARFORMOTEROL TARTRATE 15 MCG: 15 SOLUTION RESPIRATORY (INHALATION) at 09:38

## 2017-10-27 RX ADMIN — DOCUSATE SODIUM 100 MG: 100 CAPSULE, LIQUID FILLED ORAL at 20:45

## 2017-10-27 RX ADMIN — DOCUSATE SODIUM 100 MG: 100 CAPSULE, LIQUID FILLED ORAL at 09:37

## 2017-10-27 RX ADMIN — GLIMEPIRIDE 2 MG: 2 TABLET ORAL at 17:10

## 2017-10-27 RX ADMIN — BUDESONIDE 500 MCG: 0.5 INHALANT RESPIRATORY (INHALATION) at 09:37

## 2017-10-27 RX ADMIN — HEPARIN SODIUM 5000 UNITS: 5000 INJECTION, SOLUTION INTRAVENOUS; SUBCUTANEOUS at 13:22

## 2017-10-27 RX ADMIN — DOCUSATE SODIUM AND SENNOSIDES 1 TABLET: 8.6; 5 TABLET, FILM COATED ORAL at 20:45

## 2017-10-27 RX ADMIN — ATORVASTATIN CALCIUM 10 MG: 10 TABLET, FILM COATED ORAL at 09:37

## 2017-10-27 RX ADMIN — HEPARIN SODIUM 5000 UNITS: 5000 INJECTION, SOLUTION INTRAVENOUS; SUBCUTANEOUS at 20:45

## 2017-10-27 RX ADMIN — VILAZODONE HYDROCHLORIDE 20 MG: 20 TABLET ORAL at 17:11

## 2017-10-27 RX ADMIN — ASPIRIN 81 MG 81 MG: 81 TABLET ORAL at 09:36

## 2017-10-27 RX ADMIN — ARFORMOTEROL TARTRATE 15 MCG: 15 SOLUTION RESPIRATORY (INHALATION) at 20:45

## 2017-10-27 RX ADMIN — SITAGLIPTIN 25 MG: 25 TABLET, FILM COATED ORAL at 09:37

## 2017-10-27 RX ADMIN — HEPARIN SODIUM 5000 UNITS: 5000 INJECTION, SOLUTION INTRAVENOUS; SUBCUTANEOUS at 05:30

## 2017-10-28 PROCEDURE — 74011250636 HC RX REV CODE- 250/636: Performed by: PHYSICAL MEDICINE & REHABILITATION

## 2017-10-28 PROCEDURE — 74011636637 HC RX REV CODE- 636/637: Performed by: PHYSICAL MEDICINE & REHABILITATION

## 2017-10-28 PROCEDURE — 74011250637 HC RX REV CODE- 250/637: Performed by: PHYSICAL MEDICINE & REHABILITATION

## 2017-10-28 PROCEDURE — 74011000250 HC RX REV CODE- 250: Performed by: PHYSICAL MEDICINE & REHABILITATION

## 2017-10-28 RX ADMIN — DOCUSATE SODIUM 100 MG: 100 CAPSULE, LIQUID FILLED ORAL at 08:55

## 2017-10-28 RX ADMIN — DOCUSATE SODIUM 100 MG: 100 CAPSULE, LIQUID FILLED ORAL at 21:47

## 2017-10-28 RX ADMIN — MONTELUKAST SODIUM 10 MG: 10 TABLET, FILM COATED ORAL at 17:20

## 2017-10-28 RX ADMIN — INSULIN LISPRO 2 UNITS: 100 INJECTION, SOLUTION INTRAVENOUS; SUBCUTANEOUS at 17:20

## 2017-10-28 RX ADMIN — ARFORMOTEROL TARTRATE 15 MCG: 15 SOLUTION RESPIRATORY (INHALATION) at 08:54

## 2017-10-28 RX ADMIN — BUDESONIDE 500 MCG: 0.5 INHALANT RESPIRATORY (INHALATION) at 08:54

## 2017-10-28 RX ADMIN — GLIMEPIRIDE 2 MG: 2 TABLET ORAL at 17:20

## 2017-10-28 RX ADMIN — FENOFIBRATE 145 MG: 145 TABLET ORAL at 17:20

## 2017-10-28 RX ADMIN — VILAZODONE HYDROCHLORIDE 20 MG: 20 TABLET ORAL at 17:20

## 2017-10-28 RX ADMIN — BUDESONIDE 500 MCG: 0.5 INHALANT RESPIRATORY (INHALATION) at 21:47

## 2017-10-28 RX ADMIN — ASPIRIN 81 MG 81 MG: 81 TABLET ORAL at 08:55

## 2017-10-28 RX ADMIN — ZOLPIDEM TARTRATE 2.5 MG: 5 TABLET ORAL at 21:48

## 2017-10-28 RX ADMIN — SITAGLIPTIN 25 MG: 25 TABLET, FILM COATED ORAL at 08:55

## 2017-10-28 RX ADMIN — DOCUSATE SODIUM AND SENNOSIDES 1 TABLET: 8.6; 5 TABLET, FILM COATED ORAL at 21:47

## 2017-10-28 RX ADMIN — ATORVASTATIN CALCIUM 10 MG: 10 TABLET, FILM COATED ORAL at 08:55

## 2017-10-28 RX ADMIN — HEPARIN SODIUM 5000 UNITS: 5000 INJECTION, SOLUTION INTRAVENOUS; SUBCUTANEOUS at 13:37

## 2017-10-28 RX ADMIN — DOCUSATE SODIUM AND SENNOSIDES 1 TABLET: 8.6; 5 TABLET, FILM COATED ORAL at 08:55

## 2017-10-28 RX ADMIN — HEPARIN SODIUM 5000 UNITS: 5000 INJECTION, SOLUTION INTRAVENOUS; SUBCUTANEOUS at 05:30

## 2017-10-28 RX ADMIN — ARFORMOTEROL TARTRATE 15 MCG: 15 SOLUTION RESPIRATORY (INHALATION) at 21:46

## 2017-10-28 RX ADMIN — HEPARIN SODIUM 5000 UNITS: 5000 INJECTION, SOLUTION INTRAVENOUS; SUBCUTANEOUS at 21:47

## 2017-10-29 PROCEDURE — 74011000250 HC RX REV CODE- 250: Performed by: PHYSICAL MEDICINE & REHABILITATION

## 2017-10-29 PROCEDURE — 74011250637 HC RX REV CODE- 250/637: Performed by: PHYSICAL MEDICINE & REHABILITATION

## 2017-10-29 PROCEDURE — 74011250636 HC RX REV CODE- 250/636: Performed by: PHYSICAL MEDICINE & REHABILITATION

## 2017-10-29 PROCEDURE — 74011636637 HC RX REV CODE- 636/637: Performed by: PHYSICAL MEDICINE & REHABILITATION

## 2017-10-29 RX ADMIN — DOCUSATE SODIUM AND SENNOSIDES 1 TABLET: 8.6; 5 TABLET, FILM COATED ORAL at 21:55

## 2017-10-29 RX ADMIN — MONTELUKAST SODIUM 10 MG: 10 TABLET, FILM COATED ORAL at 17:08

## 2017-10-29 RX ADMIN — DOCUSATE SODIUM AND SENNOSIDES 1 TABLET: 8.6; 5 TABLET, FILM COATED ORAL at 08:20

## 2017-10-29 RX ADMIN — ARFORMOTEROL TARTRATE 15 MCG: 15 SOLUTION RESPIRATORY (INHALATION) at 08:26

## 2017-10-29 RX ADMIN — HEPARIN SODIUM 5000 UNITS: 5000 INJECTION, SOLUTION INTRAVENOUS; SUBCUTANEOUS at 21:55

## 2017-10-29 RX ADMIN — DOCUSATE SODIUM 100 MG: 100 CAPSULE, LIQUID FILLED ORAL at 21:55

## 2017-10-29 RX ADMIN — INSULIN LISPRO 2 UNITS: 100 INJECTION, SOLUTION INTRAVENOUS; SUBCUTANEOUS at 17:04

## 2017-10-29 RX ADMIN — VILAZODONE HYDROCHLORIDE 20 MG: 20 TABLET ORAL at 17:08

## 2017-10-29 RX ADMIN — DOCUSATE SODIUM 100 MG: 100 CAPSULE, LIQUID FILLED ORAL at 08:20

## 2017-10-29 RX ADMIN — HEPARIN SODIUM 5000 UNITS: 5000 INJECTION, SOLUTION INTRAVENOUS; SUBCUTANEOUS at 13:31

## 2017-10-29 RX ADMIN — GLIMEPIRIDE 2 MG: 2 TABLET ORAL at 17:08

## 2017-10-29 RX ADMIN — ARFORMOTEROL TARTRATE 15 MCG: 15 SOLUTION RESPIRATORY (INHALATION) at 21:55

## 2017-10-29 RX ADMIN — SITAGLIPTIN 25 MG: 25 TABLET, FILM COATED ORAL at 08:20

## 2017-10-29 RX ADMIN — FENOFIBRATE 145 MG: 145 TABLET ORAL at 17:08

## 2017-10-29 RX ADMIN — HEPARIN SODIUM 5000 UNITS: 5000 INJECTION, SOLUTION INTRAVENOUS; SUBCUTANEOUS at 06:16

## 2017-10-29 RX ADMIN — ASPIRIN 81 MG 81 MG: 81 TABLET ORAL at 08:20

## 2017-10-29 RX ADMIN — BUDESONIDE 500 MCG: 0.5 INHALANT RESPIRATORY (INHALATION) at 21:55

## 2017-10-29 RX ADMIN — BUDESONIDE 500 MCG: 0.5 INHALANT RESPIRATORY (INHALATION) at 08:20

## 2017-10-29 RX ADMIN — ZOLPIDEM TARTRATE 2.5 MG: 5 TABLET ORAL at 21:55

## 2017-10-29 RX ADMIN — ATORVASTATIN CALCIUM 10 MG: 10 TABLET, FILM COATED ORAL at 08:20

## 2017-10-30 LAB
ERYTHROCYTE [DISTWIDTH] IN BLOOD BY AUTOMATED COUNT: 13.7 % (ref 11.5–14.5)
HCT VFR BLD AUTO: 38 % (ref 36.6–50.3)
HGB BLD-MCNC: 12.2 G/DL (ref 12.1–17)
MCH RBC QN AUTO: 28.8 PG (ref 26–34)
MCHC RBC AUTO-ENTMCNC: 32.1 G/DL (ref 30–36.5)
MCV RBC AUTO: 89.6 FL (ref 80–99)
PLATELET # BLD AUTO: 247 K/UL (ref 150–400)
RBC # BLD AUTO: 4.24 M/UL (ref 4.1–5.7)
WBC # BLD AUTO: 6.5 K/UL (ref 4.1–11.1)

## 2017-10-30 PROCEDURE — 74011250636 HC RX REV CODE- 250/636: Performed by: PHYSICAL MEDICINE & REHABILITATION

## 2017-10-30 PROCEDURE — 85027 COMPLETE CBC AUTOMATED: CPT | Performed by: PHYSICAL MEDICINE & REHABILITATION

## 2017-10-30 PROCEDURE — 74011250637 HC RX REV CODE- 250/637: Performed by: PHYSICAL MEDICINE & REHABILITATION

## 2017-10-30 PROCEDURE — 36415 COLL VENOUS BLD VENIPUNCTURE: CPT | Performed by: PHYSICAL MEDICINE & REHABILITATION

## 2017-10-30 PROCEDURE — 74011000250 HC RX REV CODE- 250: Performed by: PHYSICAL MEDICINE & REHABILITATION

## 2017-10-30 RX ORDER — SODIUM CHLORIDE 9 MG/ML
100 INJECTION, SOLUTION INTRAVENOUS CONTINUOUS
Status: DISPENSED | OUTPATIENT
Start: 2017-10-30 | End: 2017-10-30

## 2017-10-30 RX ADMIN — HEPARIN SODIUM 5000 UNITS: 5000 INJECTION, SOLUTION INTRAVENOUS; SUBCUTANEOUS at 21:13

## 2017-10-30 RX ADMIN — ZOLPIDEM TARTRATE 2.5 MG: 5 TABLET ORAL at 21:13

## 2017-10-30 RX ADMIN — DOCUSATE SODIUM 100 MG: 100 CAPSULE, LIQUID FILLED ORAL at 21:13

## 2017-10-30 RX ADMIN — BUDESONIDE 500 MCG: 0.5 INHALANT RESPIRATORY (INHALATION) at 08:23

## 2017-10-30 RX ADMIN — ASPIRIN 81 MG 81 MG: 81 TABLET ORAL at 08:23

## 2017-10-30 RX ADMIN — DOCUSATE SODIUM AND SENNOSIDES 1 TABLET: 8.6; 5 TABLET, FILM COATED ORAL at 08:23

## 2017-10-30 RX ADMIN — MONTELUKAST SODIUM 10 MG: 10 TABLET, FILM COATED ORAL at 17:10

## 2017-10-30 RX ADMIN — ARFORMOTEROL TARTRATE 15 MCG: 15 SOLUTION RESPIRATORY (INHALATION) at 21:19

## 2017-10-30 RX ADMIN — DOCUSATE SODIUM AND SENNOSIDES 1 TABLET: 8.6; 5 TABLET, FILM COATED ORAL at 21:13

## 2017-10-30 RX ADMIN — BUDESONIDE 500 MCG: 0.5 INHALANT RESPIRATORY (INHALATION) at 21:13

## 2017-10-30 RX ADMIN — ARFORMOTEROL TARTRATE 15 MCG: 15 SOLUTION RESPIRATORY (INHALATION) at 08:23

## 2017-10-30 RX ADMIN — SITAGLIPTIN 25 MG: 25 TABLET, FILM COATED ORAL at 08:23

## 2017-10-30 RX ADMIN — DOCUSATE SODIUM 100 MG: 100 CAPSULE, LIQUID FILLED ORAL at 08:23

## 2017-10-30 RX ADMIN — ATORVASTATIN CALCIUM 10 MG: 10 TABLET, FILM COATED ORAL at 08:23

## 2017-10-30 RX ADMIN — FENOFIBRATE 145 MG: 145 TABLET ORAL at 17:10

## 2017-10-30 RX ADMIN — HEPARIN SODIUM 5000 UNITS: 5000 INJECTION, SOLUTION INTRAVENOUS; SUBCUTANEOUS at 13:36

## 2017-10-30 RX ADMIN — GLIMEPIRIDE 2 MG: 2 TABLET ORAL at 17:10

## 2017-10-30 RX ADMIN — HEPARIN SODIUM 5000 UNITS: 5000 INJECTION, SOLUTION INTRAVENOUS; SUBCUTANEOUS at 05:34

## 2017-10-30 RX ADMIN — VILAZODONE HYDROCHLORIDE 20 MG: 20 TABLET ORAL at 17:10

## 2017-10-31 PROCEDURE — 74011250637 HC RX REV CODE- 250/637: Performed by: PHYSICAL MEDICINE & REHABILITATION

## 2017-10-31 PROCEDURE — 74011000250 HC RX REV CODE- 250: Performed by: PHYSICAL MEDICINE & REHABILITATION

## 2017-10-31 PROCEDURE — 74011250636 HC RX REV CODE- 250/636: Performed by: PHYSICAL MEDICINE & REHABILITATION

## 2017-10-31 RX ORDER — GLIMEPIRIDE 2 MG/1
2 TABLET ORAL
Status: DISCONTINUED | OUTPATIENT
Start: 2017-11-01 | End: 2017-11-03 | Stop reason: HOSPADM

## 2017-10-31 RX ADMIN — BUDESONIDE 500 MCG: 0.5 INHALANT RESPIRATORY (INHALATION) at 08:12

## 2017-10-31 RX ADMIN — DOCUSATE SODIUM 100 MG: 100 CAPSULE, LIQUID FILLED ORAL at 20:57

## 2017-10-31 RX ADMIN — ARFORMOTEROL TARTRATE 15 MCG: 15 SOLUTION RESPIRATORY (INHALATION) at 08:12

## 2017-10-31 RX ADMIN — HEPARIN SODIUM 5000 UNITS: 5000 INJECTION, SOLUTION INTRAVENOUS; SUBCUTANEOUS at 06:06

## 2017-10-31 RX ADMIN — DOCUSATE SODIUM 100 MG: 100 CAPSULE, LIQUID FILLED ORAL at 08:12

## 2017-10-31 RX ADMIN — ASPIRIN 81 MG 81 MG: 81 TABLET ORAL at 08:12

## 2017-10-31 RX ADMIN — DOCUSATE SODIUM AND SENNOSIDES 1 TABLET: 8.6; 5 TABLET, FILM COATED ORAL at 20:57

## 2017-10-31 RX ADMIN — DOCUSATE SODIUM AND SENNOSIDES 1 TABLET: 8.6; 5 TABLET, FILM COATED ORAL at 08:12

## 2017-10-31 RX ADMIN — ARFORMOTEROL TARTRATE 15 MCG: 15 SOLUTION RESPIRATORY (INHALATION) at 21:05

## 2017-10-31 RX ADMIN — BUDESONIDE 500 MCG: 0.5 INHALANT RESPIRATORY (INHALATION) at 20:57

## 2017-10-31 RX ADMIN — HEPARIN SODIUM 5000 UNITS: 5000 INJECTION, SOLUTION INTRAVENOUS; SUBCUTANEOUS at 14:38

## 2017-10-31 RX ADMIN — ZOLPIDEM TARTRATE 2.5 MG: 5 TABLET ORAL at 21:01

## 2017-10-31 RX ADMIN — FENOFIBRATE 145 MG: 145 TABLET ORAL at 17:17

## 2017-10-31 RX ADMIN — SITAGLIPTIN 25 MG: 25 TABLET, FILM COATED ORAL at 08:12

## 2017-10-31 RX ADMIN — HEPARIN SODIUM 5000 UNITS: 5000 INJECTION, SOLUTION INTRAVENOUS; SUBCUTANEOUS at 21:02

## 2017-10-31 RX ADMIN — MONTELUKAST SODIUM 10 MG: 10 TABLET, FILM COATED ORAL at 17:17

## 2017-10-31 RX ADMIN — ATORVASTATIN CALCIUM 10 MG: 10 TABLET, FILM COATED ORAL at 08:12

## 2017-10-31 RX ADMIN — VILAZODONE HYDROCHLORIDE 20 MG: 20 TABLET ORAL at 17:17

## 2017-11-01 LAB
ALBUMIN SERPL-MCNC: 2.7 G/DL (ref 3.5–5)
ANION GAP SERPL CALC-SCNC: 9 MMOL/L (ref 5–15)
BUN SERPL-MCNC: 41 MG/DL (ref 6–20)
BUN/CREAT SERPL: 21 (ref 12–20)
CALCIUM SERPL-MCNC: 9.2 MG/DL (ref 8.5–10.1)
CHLORIDE SERPL-SCNC: 106 MMOL/L (ref 97–108)
CO2 SERPL-SCNC: 26 MMOL/L (ref 21–32)
CREAT SERPL-MCNC: 1.91 MG/DL (ref 0.7–1.3)
GLUCOSE SERPL-MCNC: 98 MG/DL (ref 65–100)
PHOSPHATE SERPL-MCNC: 3.4 MG/DL (ref 2.6–4.7)
POTASSIUM SERPL-SCNC: 4.3 MMOL/L (ref 3.5–5.1)
SODIUM SERPL-SCNC: 141 MMOL/L (ref 136–145)

## 2017-11-01 PROCEDURE — 74011000250 HC RX REV CODE- 250: Performed by: PHYSICAL MEDICINE & REHABILITATION

## 2017-11-01 PROCEDURE — 80069 RENAL FUNCTION PANEL: CPT | Performed by: PHYSICAL MEDICINE & REHABILITATION

## 2017-11-01 PROCEDURE — 74011250636 HC RX REV CODE- 250/636: Performed by: PHYSICAL MEDICINE & REHABILITATION

## 2017-11-01 PROCEDURE — 36415 COLL VENOUS BLD VENIPUNCTURE: CPT | Performed by: PHYSICAL MEDICINE & REHABILITATION

## 2017-11-01 PROCEDURE — 74011250637 HC RX REV CODE- 250/637: Performed by: PHYSICAL MEDICINE & REHABILITATION

## 2017-11-01 RX ADMIN — FENOFIBRATE 145 MG: 145 TABLET ORAL at 17:55

## 2017-11-01 RX ADMIN — ASPIRIN 81 MG 81 MG: 81 TABLET ORAL at 08:04

## 2017-11-01 RX ADMIN — ZOLPIDEM TARTRATE 2.5 MG: 5 TABLET ORAL at 22:37

## 2017-11-01 RX ADMIN — BUDESONIDE 500 MCG: 0.5 INHALANT RESPIRATORY (INHALATION) at 08:03

## 2017-11-01 RX ADMIN — DOCUSATE SODIUM AND SENNOSIDES 1 TABLET: 8.6; 5 TABLET, FILM COATED ORAL at 08:03

## 2017-11-01 RX ADMIN — ARFORMOTEROL TARTRATE 15 MCG: 15 SOLUTION RESPIRATORY (INHALATION) at 08:03

## 2017-11-01 RX ADMIN — GLIMEPIRIDE 2 MG: 2 TABLET ORAL at 08:03

## 2017-11-01 RX ADMIN — VILAZODONE HYDROCHLORIDE 20 MG: 20 TABLET ORAL at 17:59

## 2017-11-01 RX ADMIN — BUDESONIDE 500 MCG: 0.5 INHALANT RESPIRATORY (INHALATION) at 20:36

## 2017-11-01 RX ADMIN — ATORVASTATIN CALCIUM 10 MG: 10 TABLET, FILM COATED ORAL at 08:04

## 2017-11-01 RX ADMIN — HEPARIN SODIUM 5000 UNITS: 5000 INJECTION, SOLUTION INTRAVENOUS; SUBCUTANEOUS at 14:20

## 2017-11-01 RX ADMIN — MONTELUKAST SODIUM 10 MG: 10 TABLET, FILM COATED ORAL at 17:55

## 2017-11-01 RX ADMIN — ARFORMOTEROL TARTRATE 15 MCG: 15 SOLUTION RESPIRATORY (INHALATION) at 20:36

## 2017-11-01 RX ADMIN — DOCUSATE SODIUM 100 MG: 100 CAPSULE, LIQUID FILLED ORAL at 08:03

## 2017-11-01 RX ADMIN — HEPARIN SODIUM 5000 UNITS: 5000 INJECTION, SOLUTION INTRAVENOUS; SUBCUTANEOUS at 22:38

## 2017-11-01 RX ADMIN — HEPARIN SODIUM 5000 UNITS: 5000 INJECTION, SOLUTION INTRAVENOUS; SUBCUTANEOUS at 05:36

## 2017-11-02 LAB
ANION GAP SERPL CALC-SCNC: 12 MMOL/L (ref 5–15)
BUN SERPL-MCNC: 42 MG/DL (ref 6–20)
BUN/CREAT SERPL: 20 (ref 12–20)
CALCIUM SERPL-MCNC: 9.8 MG/DL (ref 8.5–10.1)
CHLORIDE SERPL-SCNC: 105 MMOL/L (ref 97–108)
CO2 SERPL-SCNC: 24 MMOL/L (ref 21–32)
CREAT SERPL-MCNC: 2.15 MG/DL (ref 0.7–1.3)
ERYTHROCYTE [DISTWIDTH] IN BLOOD BY AUTOMATED COUNT: 13.7 % (ref 11.5–14.5)
GLUCOSE SERPL-MCNC: 92 MG/DL (ref 65–100)
HCT VFR BLD AUTO: 37.7 % (ref 36.6–50.3)
HGB BLD-MCNC: 12 G/DL (ref 12.1–17)
MCH RBC QN AUTO: 29.1 PG (ref 26–34)
MCHC RBC AUTO-ENTMCNC: 31.8 G/DL (ref 30–36.5)
MCV RBC AUTO: 91.5 FL (ref 80–99)
PLATELET # BLD AUTO: 272 K/UL (ref 150–400)
POTASSIUM SERPL-SCNC: 4.2 MMOL/L (ref 3.5–5.1)
RBC # BLD AUTO: 4.12 M/UL (ref 4.1–5.7)
SODIUM SERPL-SCNC: 141 MMOL/L (ref 136–145)
WBC # BLD AUTO: 6.8 K/UL (ref 4.1–11.1)

## 2017-11-02 PROCEDURE — 74011250637 HC RX REV CODE- 250/637: Performed by: PHYSICAL MEDICINE & REHABILITATION

## 2017-11-02 PROCEDURE — 36415 COLL VENOUS BLD VENIPUNCTURE: CPT | Performed by: PHYSICAL MEDICINE & REHABILITATION

## 2017-11-02 PROCEDURE — 85027 COMPLETE CBC AUTOMATED: CPT | Performed by: PHYSICAL MEDICINE & REHABILITATION

## 2017-11-02 PROCEDURE — 74011000250 HC RX REV CODE- 250: Performed by: PHYSICAL MEDICINE & REHABILITATION

## 2017-11-02 PROCEDURE — 74011250636 HC RX REV CODE- 250/636: Performed by: PHYSICAL MEDICINE & REHABILITATION

## 2017-11-02 PROCEDURE — 80048 BASIC METABOLIC PNL TOTAL CA: CPT | Performed by: PHYSICAL MEDICINE & REHABILITATION

## 2017-11-02 RX ADMIN — VILAZODONE HYDROCHLORIDE 20 MG: 20 TABLET ORAL at 17:21

## 2017-11-02 RX ADMIN — ASPIRIN 81 MG 81 MG: 81 TABLET ORAL at 08:24

## 2017-11-02 RX ADMIN — BUDESONIDE 500 MCG: 0.5 INHALANT RESPIRATORY (INHALATION) at 20:56

## 2017-11-02 RX ADMIN — HEPARIN SODIUM 5000 UNITS: 5000 INJECTION, SOLUTION INTRAVENOUS; SUBCUTANEOUS at 21:01

## 2017-11-02 RX ADMIN — HEPARIN SODIUM 5000 UNITS: 5000 INJECTION, SOLUTION INTRAVENOUS; SUBCUTANEOUS at 13:11

## 2017-11-02 RX ADMIN — DOCUSATE SODIUM AND SENNOSIDES 1 TABLET: 8.6; 5 TABLET, FILM COATED ORAL at 20:57

## 2017-11-02 RX ADMIN — MONTELUKAST SODIUM 10 MG: 10 TABLET, FILM COATED ORAL at 17:07

## 2017-11-02 RX ADMIN — ARFORMOTEROL TARTRATE 15 MCG: 15 SOLUTION RESPIRATORY (INHALATION) at 21:00

## 2017-11-02 RX ADMIN — HEPARIN SODIUM 5000 UNITS: 5000 INJECTION, SOLUTION INTRAVENOUS; SUBCUTANEOUS at 06:33

## 2017-11-02 RX ADMIN — DOCUSATE SODIUM 100 MG: 100 CAPSULE, LIQUID FILLED ORAL at 08:24

## 2017-11-02 RX ADMIN — BUDESONIDE 500 MCG: 0.5 INHALANT RESPIRATORY (INHALATION) at 08:24

## 2017-11-02 RX ADMIN — FENOFIBRATE 145 MG: 145 TABLET ORAL at 17:07

## 2017-11-02 RX ADMIN — DOCUSATE SODIUM AND SENNOSIDES 1 TABLET: 8.6; 5 TABLET, FILM COATED ORAL at 08:24

## 2017-11-02 RX ADMIN — ZOLPIDEM TARTRATE 2.5 MG: 5 TABLET ORAL at 20:57

## 2017-11-02 RX ADMIN — DOCUSATE SODIUM 100 MG: 100 CAPSULE, LIQUID FILLED ORAL at 20:57

## 2017-11-02 RX ADMIN — ATORVASTATIN CALCIUM 10 MG: 10 TABLET, FILM COATED ORAL at 08:24

## 2017-11-02 RX ADMIN — ARFORMOTEROL TARTRATE 15 MCG: 15 SOLUTION RESPIRATORY (INHALATION) at 09:00

## 2017-11-02 RX ADMIN — GLIMEPIRIDE 2 MG: 2 TABLET ORAL at 08:24

## 2017-11-03 LAB
ANION GAP SERPL CALC-SCNC: 5 MMOL/L (ref 5–15)
BUN SERPL-MCNC: 36 MG/DL (ref 6–20)
BUN/CREAT SERPL: 20 (ref 12–20)
CALCIUM SERPL-MCNC: 9 MG/DL (ref 8.5–10.1)
CHLORIDE SERPL-SCNC: 104 MMOL/L (ref 97–108)
CO2 SERPL-SCNC: 28 MMOL/L (ref 21–32)
CREAT SERPL-MCNC: 1.8 MG/DL (ref 0.7–1.3)
GLUCOSE SERPL-MCNC: 82 MG/DL (ref 65–100)
POTASSIUM SERPL-SCNC: 4.2 MMOL/L (ref 3.5–5.1)
SODIUM SERPL-SCNC: 137 MMOL/L (ref 136–145)

## 2017-11-03 PROCEDURE — 74011250637 HC RX REV CODE- 250/637: Performed by: PHYSICAL MEDICINE & REHABILITATION

## 2017-11-03 PROCEDURE — 36415 COLL VENOUS BLD VENIPUNCTURE: CPT | Performed by: PHYSICAL MEDICINE & REHABILITATION

## 2017-11-03 PROCEDURE — 74011250636 HC RX REV CODE- 250/636: Performed by: PHYSICAL MEDICINE & REHABILITATION

## 2017-11-03 PROCEDURE — 74011000250 HC RX REV CODE- 250: Performed by: PHYSICAL MEDICINE & REHABILITATION

## 2017-11-03 PROCEDURE — 80048 BASIC METABOLIC PNL TOTAL CA: CPT | Performed by: PHYSICAL MEDICINE & REHABILITATION

## 2017-11-03 RX ADMIN — ARFORMOTEROL TARTRATE 15 MCG: 15 SOLUTION RESPIRATORY (INHALATION) at 10:31

## 2017-11-03 RX ADMIN — HEPARIN SODIUM 5000 UNITS: 5000 INJECTION, SOLUTION INTRAVENOUS; SUBCUTANEOUS at 05:50

## 2017-11-03 RX ADMIN — GLIMEPIRIDE 2 MG: 2 TABLET ORAL at 09:07

## 2017-11-03 RX ADMIN — DOCUSATE SODIUM AND SENNOSIDES 1 TABLET: 8.6; 5 TABLET, FILM COATED ORAL at 09:08

## 2017-11-03 RX ADMIN — ATORVASTATIN CALCIUM 10 MG: 10 TABLET, FILM COATED ORAL at 09:07

## 2017-11-03 RX ADMIN — POLYETHYLENE GLYCOL 3350 17 G: 17 POWDER, FOR SOLUTION ORAL at 09:07

## 2017-11-03 RX ADMIN — DOCUSATE SODIUM 100 MG: 100 CAPSULE, LIQUID FILLED ORAL at 09:07

## 2017-11-03 RX ADMIN — HEPARIN SODIUM 5000 UNITS: 5000 INJECTION, SOLUTION INTRAVENOUS; SUBCUTANEOUS at 13:11

## 2017-11-03 RX ADMIN — ASPIRIN 81 MG 81 MG: 81 TABLET ORAL at 09:08

## 2017-11-03 RX ADMIN — BUDESONIDE 500 MCG: 0.5 INHALANT RESPIRATORY (INHALATION) at 07:20
